# Patient Record
Sex: FEMALE | Race: WHITE | NOT HISPANIC OR LATINO | Employment: FULL TIME | URBAN - METROPOLITAN AREA
[De-identification: names, ages, dates, MRNs, and addresses within clinical notes are randomized per-mention and may not be internally consistent; named-entity substitution may affect disease eponyms.]

---

## 2023-09-26 PROBLEM — J44.9 COPD (CHRONIC OBSTRUCTIVE PULMONARY DISEASE) (MULTI): Status: ACTIVE | Noted: 2023-09-26

## 2023-09-26 PROBLEM — L90.0 LICHEN SCLEROSUS: Status: ACTIVE | Noted: 2023-09-26

## 2023-09-26 PROBLEM — M79.7 FIBROMYALGIA: Status: ACTIVE | Noted: 2023-09-26

## 2023-09-26 PROBLEM — R91.1 PULMONARY NODULE: Status: ACTIVE | Noted: 2023-09-26

## 2023-09-26 PROBLEM — M54.16 LUMBAR BACK PAIN WITH RADICULOPATHY AFFECTING LOWER EXTREMITY: Status: ACTIVE | Noted: 2023-09-26

## 2023-09-26 PROBLEM — E78.5 HYPERLIPIDEMIA: Status: ACTIVE | Noted: 2023-09-26

## 2023-09-26 PROBLEM — L29.9 ITCHY SCALP: Status: ACTIVE | Noted: 2023-09-26

## 2023-09-26 PROBLEM — M54.2 CERVICALGIA: Status: ACTIVE | Noted: 2023-09-26

## 2023-09-26 PROBLEM — R06.02 SOB (SHORTNESS OF BREATH) ON EXERTION: Status: ACTIVE | Noted: 2023-09-26

## 2023-09-27 ENCOUNTER — OFFICE VISIT (OUTPATIENT)
Dept: PRIMARY CARE | Facility: CLINIC | Age: 63
End: 2023-09-27
Payer: COMMERCIAL

## 2023-09-27 VITALS
TEMPERATURE: 97.5 F | RESPIRATION RATE: 18 BRPM | BODY MASS INDEX: 38.77 KG/M2 | DIASTOLIC BLOOD PRESSURE: 68 MMHG | WEIGHT: 215.4 LBS | HEART RATE: 83 BPM | SYSTOLIC BLOOD PRESSURE: 112 MMHG | OXYGEN SATURATION: 96 %

## 2023-09-27 DIAGNOSIS — E78.5 HYPERLIPIDEMIA, UNSPECIFIED HYPERLIPIDEMIA TYPE: ICD-10-CM

## 2023-09-27 DIAGNOSIS — R06.02 SOB (SHORTNESS OF BREATH): Primary | ICD-10-CM

## 2023-09-27 DIAGNOSIS — Z23 ENCOUNTER FOR IMMUNIZATION: ICD-10-CM

## 2023-09-27 DIAGNOSIS — Z12.11 SCREEN FOR COLON CANCER: ICD-10-CM

## 2023-09-27 PROCEDURE — 99213 OFFICE O/P EST LOW 20 MIN: CPT | Performed by: FAMILY MEDICINE

## 2023-09-27 PROCEDURE — 90471 IMMUNIZATION ADMIN: CPT | Performed by: FAMILY MEDICINE

## 2023-09-27 PROCEDURE — 90677 PCV20 VACCINE IM: CPT | Performed by: FAMILY MEDICINE

## 2023-09-27 RX ORDER — ROSUVASTATIN CALCIUM 20 MG/1
20 TABLET, COATED ORAL DAILY
Qty: 90 TABLET | Refills: 1 | Status: SHIPPED | OUTPATIENT
Start: 2023-09-27

## 2023-09-27 RX ORDER — CLOBETASOL PROPIONATE 0.5 MG/G
OINTMENT TOPICAL 2 TIMES DAILY
COMMUNITY
End: 2024-04-01 | Stop reason: SDUPTHER

## 2023-09-27 RX ORDER — ESTRADIOL 0.1 MG/G
0.1 CREAM VAGINAL 2 TIMES DAILY
COMMUNITY
Start: 2022-03-16 | End: 2023-09-27 | Stop reason: ALTCHOICE

## 2023-09-27 RX ORDER — ALBUTEROL SULFATE 90 UG/1
2 AEROSOL, METERED RESPIRATORY (INHALATION) EVERY 4 HOURS PRN
Qty: 8.5 G | Refills: 5 | Status: SHIPPED | OUTPATIENT
Start: 2023-09-27 | End: 2024-09-26

## 2023-09-27 RX ORDER — METHYLPREDNISOLONE 4 MG/1
TABLET ORAL
Qty: 21 TABLET | Refills: 2 | Status: SHIPPED | OUTPATIENT
Start: 2023-09-27 | End: 2024-04-01 | Stop reason: WASHOUT

## 2023-09-27 RX ORDER — ROSUVASTATIN CALCIUM 20 MG/1
20 TABLET, COATED ORAL DAILY
COMMUNITY
End: 2023-09-27 | Stop reason: SDUPTHER

## 2023-09-27 ASSESSMENT — PATIENT HEALTH QUESTIONNAIRE - PHQ9
2. FEELING DOWN, DEPRESSED OR HOPELESS: NOT AT ALL
1. LITTLE INTEREST OR PLEASURE IN DOING THINGS: NOT AT ALL
SUM OF ALL RESPONSES TO PHQ9 QUESTIONS 1 AND 2: 0

## 2023-09-27 NOTE — PROGRESS NOTES
"Subjective   Patient ID: Colt Brice is a 63 y.o. female who presents for breathing concern.    HPI  Pt presents with above concern  Ongoing x2 weeks ago pt was sick  Pt states it \"burns\" when breathing  States chest feels \"tight\". Like there is phlegm but unable to bring up  Denies chest pain  Pt has diagnosis of COPD  Pt not a smoker    Yearly BW ordered      Review of Systems  Constitutional:  no chills, no fever and no night sweats.  Eyes: no blurred vision and no eyesight problems.  ENT: no hearing loss, no nasal congestion, no hoarseness and no sore throat.  Neck: no mass (es) and no swelling.  Cardiovascular: no chest pain, no intermittent leg claudication, no lower extremity edema, no palpitation and no syncope.  Respiratory: no cough, no shortness of breath during exertion, no shortness of breath at rest and no wheezing.  Gastrointestinal: no abdominal pain, no blood in stools, no constipation, no diarrhea, no melena, no nausea, no rectal pain and no vomiting.  Genitourinary: no dysuria, no change in urinary frequency, no urinary hesitancy and no feelings of urinary urgency.  Musculoskeletal: no arthralgias, no back pain and no myalgias.  Integumentary: no new skin lesions and no rashes.  Neurological: no difficulty walking, no headache, no limb weakness, no numbness and no tingling.  Psychiatric/Behavioral: no anxiety, no depression, no anhedonia and no substance use disorders.  Endocrine: no recent weight gain and no recent weight loss.  Hematologic/Lymphatic: no tendency for easy bruising and no swollen glands    Objective   Physical Exam  Patient plaints of chest tightness occasional wheezing burning in the chest deep inspiration occasional fevers.  Cough but not bringing up much.  HEENT exam lungs show good air entry mild expiratory wheeze and some scattered rhonchi no crackles or retractions cardiac and abdominal exams are benign.  No sinus tenderness.  Neck is supple.  /68   Pulse 83   Temp " 36.4 °C (97.5 °F)   Resp 18   Wt 97.7 kg (215 lb 6.4 oz)   SpO2 96%   BMI 38.77 kg/m²     Lab Results   Component Value Date    WBC 9.6 02/01/2023    HGB 14.2 02/01/2023    HCT 43.7 02/01/2023    MCV 99 02/01/2023     02/01/2023       Assessment/Plan no obvious bacterial infection viral versus reactive plan is refill her albuterol rescue inhaler start her on a Medrol Dosepak if she has not improved or new symptoms develop she will let us know  Problem List Items Addressed This Visit       Hyperlipidemia     Other Visit Diagnoses       Screen for colon cancer

## 2023-10-13 ENCOUNTER — LAB (OUTPATIENT)
Dept: LAB | Facility: LAB | Age: 63
End: 2023-10-13
Payer: COMMERCIAL

## 2023-10-13 DIAGNOSIS — E78.5 HYPERLIPIDEMIA, UNSPECIFIED HYPERLIPIDEMIA TYPE: ICD-10-CM

## 2023-10-13 LAB
ALBUMIN SERPL BCP-MCNC: 4.1 G/DL (ref 3.4–5)
ALP SERPL-CCNC: 69 U/L (ref 33–136)
ALT SERPL W P-5'-P-CCNC: 25 U/L (ref 7–45)
ANION GAP SERPL CALC-SCNC: 13 MMOL/L (ref 10–20)
AST SERPL W P-5'-P-CCNC: 21 U/L (ref 9–39)
BASOPHILS # BLD AUTO: 0.04 X10*3/UL (ref 0–0.1)
BASOPHILS NFR BLD AUTO: 0.4 %
BILIRUB SERPL-MCNC: 0.5 MG/DL (ref 0–1.2)
BUN SERPL-MCNC: 10 MG/DL (ref 6–23)
CALCIUM SERPL-MCNC: 9.7 MG/DL (ref 8.6–10.3)
CHLORIDE SERPL-SCNC: 101 MMOL/L (ref 98–107)
CHOLEST SERPL-MCNC: 185 MG/DL (ref 0–199)
CHOLESTEROL/HDL RATIO: 3.5
CO2 SERPL-SCNC: 31 MMOL/L (ref 21–32)
CREAT SERPL-MCNC: 0.74 MG/DL (ref 0.5–1.05)
EOSINOPHIL # BLD AUTO: 0.14 X10*3/UL (ref 0–0.7)
EOSINOPHIL NFR BLD AUTO: 1.5 %
ERYTHROCYTE [DISTWIDTH] IN BLOOD BY AUTOMATED COUNT: 13.2 % (ref 11.5–14.5)
GFR SERPL CREATININE-BSD FRML MDRD: >90 ML/MIN/1.73M*2
GLUCOSE SERPL-MCNC: 151 MG/DL (ref 74–99)
HCT VFR BLD AUTO: 42.4 % (ref 36–46)
HDLC SERPL-MCNC: 52.8 MG/DL
HGB BLD-MCNC: 13.7 G/DL (ref 12–16)
IMM GRANULOCYTES # BLD AUTO: 0.03 X10*3/UL (ref 0–0.7)
IMM GRANULOCYTES NFR BLD AUTO: 0.3 % (ref 0–0.9)
LDLC SERPL CALC-MCNC: 84 MG/DL
LYMPHOCYTES # BLD AUTO: 3.39 X10*3/UL (ref 1.2–4.8)
LYMPHOCYTES NFR BLD AUTO: 35.3 %
MCH RBC QN AUTO: 31.9 PG (ref 26–34)
MCHC RBC AUTO-ENTMCNC: 32.3 G/DL (ref 32–36)
MCV RBC AUTO: 99 FL (ref 80–100)
MONOCYTES # BLD AUTO: 0.55 X10*3/UL (ref 0.1–1)
MONOCYTES NFR BLD AUTO: 5.7 %
NEUTROPHILS # BLD AUTO: 5.44 X10*3/UL (ref 1.2–7.7)
NEUTROPHILS NFR BLD AUTO: 56.8 %
NON HDL CHOLESTEROL: 132 MG/DL (ref 0–149)
NRBC BLD-RTO: 0 /100 WBCS (ref 0–0)
PLATELET # BLD AUTO: 245 X10*3/UL (ref 150–450)
PMV BLD AUTO: 11.1 FL (ref 7.5–11.5)
POTASSIUM SERPL-SCNC: 4.3 MMOL/L (ref 3.5–5.3)
PROT SERPL-MCNC: 7 G/DL (ref 6.4–8.2)
RBC # BLD AUTO: 4.3 X10*6/UL (ref 4–5.2)
SODIUM SERPL-SCNC: 141 MMOL/L (ref 136–145)
TRIGL SERPL-MCNC: 241 MG/DL (ref 0–149)
VLDL: 48 MG/DL (ref 0–40)
WBC # BLD AUTO: 9.6 X10*3/UL (ref 4.4–11.3)

## 2023-10-13 PROCEDURE — 80053 COMPREHEN METABOLIC PANEL: CPT

## 2023-10-13 PROCEDURE — 80061 LIPID PANEL: CPT

## 2023-10-13 PROCEDURE — 36415 COLL VENOUS BLD VENIPUNCTURE: CPT

## 2023-10-13 PROCEDURE — 85025 COMPLETE CBC W/AUTO DIFF WBC: CPT

## 2023-11-02 ENCOUNTER — LAB (OUTPATIENT)
Dept: LAB | Facility: LAB | Age: 63
End: 2023-11-02
Payer: COMMERCIAL

## 2023-11-02 DIAGNOSIS — R73.09 ELEVATED GLUCOSE: ICD-10-CM

## 2023-11-02 LAB
EST. AVERAGE GLUCOSE BLD GHB EST-MCNC: 148 MG/DL
HBA1C MFR BLD: 6.8 %

## 2023-11-02 PROCEDURE — 36415 COLL VENOUS BLD VENIPUNCTURE: CPT

## 2023-11-02 PROCEDURE — 83036 HEMOGLOBIN GLYCOSYLATED A1C: CPT

## 2023-11-14 ENCOUNTER — TELEPHONE (OUTPATIENT)
Dept: PRIMARY CARE | Facility: CLINIC | Age: 63
End: 2023-11-14
Payer: COMMERCIAL

## 2023-11-14 NOTE — TELEPHONE ENCOUNTER
Called patient and she is aware. Made appointment with Dr Newby for 1/5/24 because she won't have her new insurance until after the first of the year    Put a copy of her labs in the mail to her (verified address with patient)

## 2023-11-14 NOTE — TELEPHONE ENCOUNTER
----- Message from KT Mcgraw sent at 11/14/2023  3:29 PM EST -----  Called and left vm for patient regarding lab result  ----- Message -----  From: Baylee Foster MA  Sent: 11/9/2023   8:00 AM EST  To: KT Mcgraw    Please advise patient BW results   ----- Message -----  From: Lab, Background User  Sent: 11/2/2023   2:13 PM EST  To: Shaun Newby MD

## 2024-01-04 NOTE — PROGRESS NOTES
Subjective   Patient ID: Colt Brice is a 63 y.o. female who presents for Results and Knee Pain.    HPI  Presents for above reason  Pt would like to discuss recent lab work done on: 11/2 and 10/13  A1c was 6.8  Pt was concerned and made lifestyle changes  Is eating better and moving more  Has cut back on snacking as well    Pt states both of her knees are still bothering her.  States she has addressed this with PCP before  Denies swelling  Very painful as she has a job that requires standing a lot  Has not had x rays done on knees before  Rx'd prednisone and pain med w/ relief while taking but wore off after a while    No other concerns    Review of Systems  Constitutional:  no chills, no fever and no night sweats.  Eyes: no blurred vision and no eyesight problems.  ENT: no hearing loss, no nasal congestion, no hoarseness and no sore throat.  Neck: no mass (es) and no swelling.  Cardiovascular: no chest pain, no intermittent leg claudication, no lower extremity edema, no palpitation and no syncope.  Respiratory: no cough, no shortness of breath during exertion, no shortness of breath at rest and no wheezing.  Gastrointestinal: no abdominal pain, no blood in stools, no constipation, no diarrhea, no melena, no nausea, no rectal pain and no vomiting.  Genitourinary: no dysuria, no change in urinary frequency, no urinary hesitancy and no feelings of urinary urgency.  Musculoskeletal: no arthralgias, no back pain and no myalgias.  Integumentary: no new skin lesions and no rashes.  Neurological: no difficulty walking, no headache, no limb weakness, no numbness and no tingling.  Psychiatric/Behavioral: no anxiety, no depression, no anhedonia and no substance use disorders.  Endocrine: no recent weight gain and no recent weight loss.  Hematologic/Lymphatic: no tendency for easy bruising and no swollen glands    Objective   Physical Exam  Patient to discuss lab work A1c remains elevated she is started to take this more  seriously trying to lose weight down 5 pounds watching her diet more closely her  is diabetic and they are both trying to stick to the diet and.  She wants to hold off on medication to see if she can get some weight off and see if that makes a difference.  No physical complaints she says she feels great.  Physical exam today's office visit constitutional alert and oriented x3.    Head is atraumatic HEENT is within normal limits.    Neck supple no masses full range of motion.    Thyroid is normal in size no thyromegaly there is no carotid bruits.    Pulmonary exam shows clear to auscultation no respiratory distress.    Cardiovascular shows no murmur rub or gallop.  Regular rate and rhythm.    Abdominal exam soft nontender no hepatosplenomegaly or masses normal bowel sounds no rebound no guarding.    Musculoskeletal exam no joint pain no muscle pain full range of motion.    Psych exam normal mood and affect.    Dermatologic exam no skin lesions no rash no blemishes.    Neuro exam is no focal deficits.  Normal exam.    Extremities no edema normal pulses normal capillary refill.    /80   Pulse 83   Resp 18   Wt 95.4 kg (210 lb 6.4 oz)   SpO2 96%   BMI 37.87 kg/m²     Lab Results   Component Value Date    WBC 9.6 10/13/2023    HGB 13.7 10/13/2023    HCT 42.4 10/13/2023    MCV 99 10/13/2023     10/13/2023       Assessment/Plan plan have her continue the weight loss and dietary modification recheck a hemoglobin A1c at the end of April follow-up at that point  Problem List Items Addressed This Visit    None

## 2024-01-05 ENCOUNTER — OFFICE VISIT (OUTPATIENT)
Dept: PRIMARY CARE | Facility: CLINIC | Age: 64
End: 2024-01-05
Payer: COMMERCIAL

## 2024-01-05 VITALS
OXYGEN SATURATION: 96 % | BODY MASS INDEX: 37.87 KG/M2 | HEART RATE: 83 BPM | SYSTOLIC BLOOD PRESSURE: 130 MMHG | WEIGHT: 210.4 LBS | RESPIRATION RATE: 18 BRPM | DIASTOLIC BLOOD PRESSURE: 80 MMHG

## 2024-01-05 DIAGNOSIS — R73.9 HYPERGLYCEMIA: Primary | ICD-10-CM

## 2024-01-05 DIAGNOSIS — G89.29 CHRONIC PAIN OF BOTH KNEES: ICD-10-CM

## 2024-01-05 DIAGNOSIS — M25.562 CHRONIC PAIN OF BOTH KNEES: ICD-10-CM

## 2024-01-05 DIAGNOSIS — M25.561 CHRONIC PAIN OF BOTH KNEES: ICD-10-CM

## 2024-01-05 PROCEDURE — 99213 OFFICE O/P EST LOW 20 MIN: CPT | Performed by: FAMILY MEDICINE

## 2024-01-05 RX ORDER — OXYCODONE AND ACETAMINOPHEN 5; 325 MG/1; MG/1
1 TABLET ORAL EVERY 8 HOURS PRN
Qty: 21 TABLET | Refills: 0 | Status: SHIPPED | OUTPATIENT
Start: 2024-01-05 | End: 2024-01-12

## 2024-01-05 RX ORDER — PREDNISONE 10 MG/1
TABLET ORAL
Qty: 51 TABLET | Refills: 0 | Status: SHIPPED | OUTPATIENT
Start: 2024-01-05 | End: 2024-04-01 | Stop reason: WASHOUT

## 2024-02-26 ENCOUNTER — TELEPHONE (OUTPATIENT)
Dept: PRIMARY CARE | Facility: CLINIC | Age: 64
End: 2024-02-26
Payer: COMMERCIAL

## 2024-02-26 DIAGNOSIS — Z12.31 SCREENING MAMMOGRAM FOR BREAST CANCER: Primary | ICD-10-CM

## 2024-02-26 DIAGNOSIS — Z13.9 SCREENING DUE: ICD-10-CM

## 2024-02-26 NOTE — TELEPHONE ENCOUNTER
SPOKE WITH PATIENT - THIS WOULD BE HER YEARLY CT RECHECK ON SOME LUNG NODULES THAT THEY WERE KEEPING AN EYE ON - LOOKS LIKE IT IS LOW DOSE CHEST CT FOR SURVEILLANCE  PER LAST YEARS REPORT - PLEASE ADVISE.

## 2024-02-26 NOTE — TELEPHONE ENCOUNTER
PATIENT'S  WAS SEEN EARLIER TODAY. SHE WAS WANTING TO HAVE A REFERRAL PUT IN FOR A MAMMOGRAM AND A CT OF THE LUNGS.     I PUT AN ORDER FOR MAMMOGRAM, AND WILL SCHEDULE PATIENT. SHE  HAD ONE DONE LAST FEBRUARY.     IS THE PATIENT OKAY TO HAVE A CT OF THE LUNGS? IS THIS ORDER OKAY TO HAVE, AND IF SO WHAT WOULD BE THE DIAGNOSIS?   PLEASE ADVISE THANK YOU.

## 2024-02-26 NOTE — TELEPHONE ENCOUNTER
LEFT PATIENT A VOICEMAIL. MAMMOGRAM ORDER HAS BEEN PLACED. SHE NEEDS TO ANSWER QUESTIONS IN ORDER TO SCHEDULE. CT HAS NOT BEEN ORDERED YET, NEED OKAY FROM [facility]

## 2024-02-26 NOTE — TELEPHONE ENCOUNTER
Patient called back and is aware that we are waiting on the ok from the doctor for the CT scan. Once ordered she is aware we will call her back to schedule both the CT and mammogram

## 2024-02-27 NOTE — TELEPHONE ENCOUNTER
Patient aware orders were placed for the CT and mammogram. She wanted to set up her appointments. She has the scheduling #

## 2024-04-01 ENCOUNTER — HOSPITAL ENCOUNTER (OUTPATIENT)
Dept: RADIOLOGY | Facility: CLINIC | Age: 64
End: 2024-04-01
Payer: COMMERCIAL

## 2024-04-01 ENCOUNTER — OFFICE VISIT (OUTPATIENT)
Dept: OBSTETRICS AND GYNECOLOGY | Facility: CLINIC | Age: 64
End: 2024-04-01
Payer: COMMERCIAL

## 2024-04-01 ENCOUNTER — APPOINTMENT (OUTPATIENT)
Dept: RADIOLOGY | Facility: CLINIC | Age: 64
End: 2024-04-01
Payer: COMMERCIAL

## 2024-04-01 VITALS
DIASTOLIC BLOOD PRESSURE: 62 MMHG | WEIGHT: 211.6 LBS | SYSTOLIC BLOOD PRESSURE: 110 MMHG | HEIGHT: 63 IN | BODY MASS INDEX: 37.49 KG/M2

## 2024-04-01 DIAGNOSIS — L90.0 LICHEN SCLEROSUS: ICD-10-CM

## 2024-04-01 PROBLEM — Z01.419 NORMAL GYNECOLOGIC EXAMINATION: Status: ACTIVE | Noted: 2024-04-01

## 2024-04-01 PROBLEM — L29.9 ITCHY SCALP: Status: RESOLVED | Noted: 2023-09-26 | Resolved: 2024-04-01

## 2024-04-01 PROBLEM — Z90.710 H/O HYSTERECTOMY FOR BENIGN DISEASE: Status: ACTIVE | Noted: 2024-04-01

## 2024-04-01 PROCEDURE — 99396 PREV VISIT EST AGE 40-64: CPT | Performed by: OBSTETRICS & GYNECOLOGY

## 2024-04-01 PROCEDURE — 1036F TOBACCO NON-USER: CPT | Performed by: OBSTETRICS & GYNECOLOGY

## 2024-04-01 RX ORDER — CLOBETASOL PROPIONATE 0.5 MG/G
OINTMENT TOPICAL 2 TIMES DAILY
Qty: 45 G | Refills: 3 | Status: SHIPPED | OUTPATIENT
Start: 2024-04-01

## 2024-04-01 NOTE — PROGRESS NOTES
"GYNECOLOGY PROGRESS NOTE        CC:    Chief Complaint   Patient presents with    Annual Exam     Est patient - annual exam - no other issues  Mamm 2023 scat fibro  tissue - has order for this year  Dexa 2022 PCP manages  Colon 2020  Chaperone ruben rosas ma  Patient no longer taking the Medrol dosepak or the Prednisone        HPI:  Colt Brice is here for a routine GYN examination.  No GYN c/o, no AUB.      Lichen sclerosis symptoms doing well, using maintenance steroid and needs refill Rx, no recurrent itching complaints.          ROS:  GI - no blood in BMs  URO - no hematuria  GYN - no AUB or vaginal discharge  PSYCH - mood OK        PHYSICAL EXAM:  /62 (BP Location: Left arm, Patient Position: Sitting)   Ht 1.588 m (5' 2.5\")   Wt 96 kg (211 lb 9.6 oz)   BMI 38.09 kg/m²   GEN:  A&O, NAD  ABD:  NT/ND, soft, no palpable masses  URO:  atrophic urethral meatus, no bladder TTP  GYN:  minimal residual sclerosis with hypopigmentation changes of vulva and perineum vulva and perineum, atrophic vulva w/o lesions or ulcers, atrophic vaginal rugae without discharge or lesions, cervix/uterus surgically absent, adnexa w/o masses or TTP   BREAST:  no masses or TTP, no skin lesions or nipple discharge  DERM:  no hirsutism or acne   PSYCH:  normal affect, non-anxious      IMPRESSION/PLAN:  Problem List Items Addressed This Visit       Lichen sclerosus    Relevant Medications    clobetasol (Temovate) 0.05 % ointment        Pap and HPV n/a, prior hysterectomy for benign indications and no Hx of HGSIL.     Mammogram up to date and normal.  Density is scattered.  Doing yearly screening mammograms yearly, PCP orders.    Colon CA screenin with polyps, repeat due in 3-5 years (scheduled 10/2024).    Lichen sclerosus:  Symptoms have completely resolved and vulvar findings greatly improved.  Vulvar biopsy done by prior GYN in 2018.   Continue maintenance regimen of Clobetasol ointment 1-2 x a week--instructed patient " to use a thin film only to prevent vulvar thinning from steroid overuse.  Need for annual surveillance with lichen sclerosus due to potential for precancerous or cancerous changes in the setting of chronic inflammation discussed with the patient.          Wenceslao Faria, DO

## 2024-05-01 ENCOUNTER — HOSPITAL ENCOUNTER (OUTPATIENT)
Dept: RADIOLOGY | Facility: CLINIC | Age: 64
Discharge: HOME | End: 2024-05-01
Payer: COMMERCIAL

## 2024-05-01 VITALS — HEIGHT: 63 IN | WEIGHT: 210 LBS | BODY MASS INDEX: 37.21 KG/M2

## 2024-05-01 DIAGNOSIS — Z12.31 SCREENING MAMMOGRAM FOR BREAST CANCER: ICD-10-CM

## 2024-05-01 DIAGNOSIS — Z13.9 SCREENING DUE: ICD-10-CM

## 2024-05-01 PROCEDURE — 77063 BREAST TOMOSYNTHESIS BI: CPT | Performed by: STUDENT IN AN ORGANIZED HEALTH CARE EDUCATION/TRAINING PROGRAM

## 2024-05-01 PROCEDURE — 71271 CT THORAX LUNG CANCER SCR C-: CPT

## 2024-05-01 PROCEDURE — 77067 SCR MAMMO BI INCL CAD: CPT | Performed by: STUDENT IN AN ORGANIZED HEALTH CARE EDUCATION/TRAINING PROGRAM

## 2024-05-01 PROCEDURE — 77067 SCR MAMMO BI INCL CAD: CPT

## 2024-05-03 ENCOUNTER — TELEPHONE (OUTPATIENT)
Dept: PRIMARY CARE | Facility: CLINIC | Age: 64
End: 2024-05-03
Payer: COMMERCIAL

## 2024-05-03 NOTE — TELEPHONE ENCOUNTER
----- Message from Shaun Newby MD sent at 5/3/2024 12:24 PM EDT -----  Normal mammogram repeat in 1 year

## 2024-05-06 ENCOUNTER — TELEPHONE (OUTPATIENT)
Dept: PRIMARY CARE | Facility: CLINIC | Age: 64
End: 2024-05-06
Payer: COMMERCIAL

## 2024-05-06 DIAGNOSIS — R06.02 SOB (SHORTNESS OF BREATH): ICD-10-CM

## 2024-05-06 DIAGNOSIS — I70.90 CALCIFICATION OF ARTERY: ICD-10-CM

## 2024-05-06 NOTE — TELEPHONE ENCOUNTER
Patient aware.    Is agreeable to see Cardiology.   Referral placed     Patient has upcoming knee surgery. Will see Cardiology after that.

## 2024-05-06 NOTE — TELEPHONE ENCOUNTER
----- Message from Shaun Newby MD sent at 5/6/2024  8:00 AM EDT -----  CAT scan of the chest shows some small scattered nodules more than likely scar tissue repeat the scan in a year as a precaution.  Also calcified coronary arteries.  Recommend referral to cardiology for possible stress test versus heart cath as a precaution.

## 2024-05-22 ENCOUNTER — TELEPHONE (OUTPATIENT)
Dept: PRIMARY CARE | Facility: CLINIC | Age: 64
End: 2024-05-22
Payer: COMMERCIAL

## 2024-05-22 DIAGNOSIS — I25.84 CORONARY ARTERY CALCIFICATION: ICD-10-CM

## 2024-05-22 DIAGNOSIS — I25.10 CORONARY ARTERY CALCIFICATION: ICD-10-CM

## 2024-05-22 NOTE — TELEPHONE ENCOUNTER
----- Message from Shaun Newby MD sent at 5/22/2024  9:56 AM EDT -----  CAT scan shows some pulmonary nodules more than likely all benign radiology is recommending a repeat CAT scan in a year.  Also she has some calcification in the coronary arteries.  Puts her at slightly higher risk for problem I would recommend referral to cardiology to see if she needs a stress test

## 2024-05-22 NOTE — TELEPHONE ENCOUNTER
Patient called back and states she already received these messages. She already has an appointment with a cardiologist. She states she noticed that there is an order in for an A1C test but she had knee surgery last week and they did an A1C test and it was 6.4 from last time. She wanted to make you aware.   PETERSON

## 2024-07-16 ENCOUNTER — OFFICE VISIT (OUTPATIENT)
Dept: PRIMARY CARE | Facility: CLINIC | Age: 64
End: 2024-07-16
Payer: COMMERCIAL

## 2024-07-16 VITALS
OXYGEN SATURATION: 97 % | HEART RATE: 79 BPM | WEIGHT: 212.4 LBS | BODY MASS INDEX: 37.63 KG/M2 | SYSTOLIC BLOOD PRESSURE: 112 MMHG | HEIGHT: 63 IN | DIASTOLIC BLOOD PRESSURE: 66 MMHG | TEMPERATURE: 97.2 F

## 2024-07-16 DIAGNOSIS — E78.5 HYPERLIPIDEMIA, UNSPECIFIED HYPERLIPIDEMIA TYPE: ICD-10-CM

## 2024-07-16 DIAGNOSIS — R06.02 SOB (SHORTNESS OF BREATH): ICD-10-CM

## 2024-07-16 DIAGNOSIS — J41.0 SIMPLE CHRONIC BRONCHITIS (MULTI): ICD-10-CM

## 2024-07-16 DIAGNOSIS — E66.01 CLASS 2 SEVERE OBESITY DUE TO EXCESS CALORIES WITH SERIOUS COMORBIDITY AND BODY MASS INDEX (BMI) OF 38.0 TO 38.9 IN ADULT (MULTI): ICD-10-CM

## 2024-07-16 DIAGNOSIS — K04.7 DENTAL ABSCESS: Primary | ICD-10-CM

## 2024-07-16 PROCEDURE — 99213 OFFICE O/P EST LOW 20 MIN: CPT | Performed by: FAMILY MEDICINE

## 2024-07-16 PROCEDURE — 3008F BODY MASS INDEX DOCD: CPT | Performed by: FAMILY MEDICINE

## 2024-07-16 PROCEDURE — 1036F TOBACCO NON-USER: CPT | Performed by: FAMILY MEDICINE

## 2024-07-16 RX ORDER — AMOXICILLIN AND CLAVULANATE POTASSIUM 875; 125 MG/1; MG/1
875 TABLET, FILM COATED ORAL 2 TIMES DAILY
Qty: 20 TABLET | Refills: 0 | Status: SHIPPED | OUTPATIENT
Start: 2024-07-16 | End: 2024-07-26

## 2024-07-16 RX ORDER — ALBUTEROL SULFATE 90 UG/1
2 AEROSOL, METERED RESPIRATORY (INHALATION) EVERY 4 HOURS PRN
Qty: 8.5 G | Refills: 5 | Status: SHIPPED | OUTPATIENT
Start: 2024-07-16 | End: 2025-07-16

## 2024-07-16 RX ORDER — OXYCODONE AND ACETAMINOPHEN 5; 325 MG/1; MG/1
1 TABLET ORAL EVERY 8 HOURS PRN
Qty: 21 TABLET | Refills: 0 | Status: SHIPPED | OUTPATIENT
Start: 2024-07-16 | End: 2024-07-23

## 2024-07-16 RX ORDER — ROSUVASTATIN CALCIUM 20 MG/1
20 TABLET, COATED ORAL DAILY
Qty: 90 TABLET | Refills: 1 | Status: SHIPPED | OUTPATIENT
Start: 2024-07-16

## 2024-07-16 ASSESSMENT — PATIENT HEALTH QUESTIONNAIRE - PHQ9
SUM OF ALL RESPONSES TO PHQ9 QUESTIONS 1 AND 2: 0
1. LITTLE INTEREST OR PLEASURE IN DOING THINGS: NOT AT ALL
2. FEELING DOWN, DEPRESSED OR HOPELESS: NOT AT ALL

## 2024-07-16 NOTE — PROGRESS NOTES
Subjective   Patient ID: Colt Brice is a 64 y.o. female who presents for Dental Pain.  HPI    Patient presents in the office today with complaints of pain with her teeth. Patient states that she has broken her teeth and can not get into the dentist until a few days. Patient states that the dentist advised if to much pain to go to the emergency room and patient decided to come to see Dr. Newby. Patient states the pain is upper and lower. Patient states her jaw is swollen. Ongoing X 3 days. Has tried ice, Aleve, and Tylenol with no relief.     Review of Systems  Constitutional:  no chills, no fever and no night sweats.  Eyes: no blurred vision and no eyesight problems.  ENT: no hearing loss, no nasal congestion, no hoarseness and no sore throat.  Neck: no mass (es) and no swelling.  Cardiovascular: no chest pain, no intermittent leg claudication, no lower extremity edema, no palpitation and no syncope.  Respiratory: no cough, no shortness of breath during exertion, no shortness of breath at rest and no wheezing.  Gastrointestinal: no abdominal pain, no blood in stools, no constipation, no diarrhea, no melena, no nausea, no rectal pain and no vomiting.  Genitourinary: no dysuria, no change in urinary frequency, no urinary hesitancy and no feelings of urinary urgency.  Musculoskeletal: no arthralgias, no back pain and no myalgias.  Integumentary: no new skin lesions and no rashes.  Neurological: no difficulty walking, no headache, no limb weakness, no numbness and no tingling.  Psychiatric/Behavioral: no anxiety, no depression, no anhedonia and no substance use disorders.  Endocrine: no recent weight gain and no recent weight loss.  Hematologic/Lymphatic: no tendency for easy bruising and no swollen glands    Objective   Physical Exam  Patient with complaints of upper and lower jaw pain and swelling on the upper jaw pain was eating popcorn and accidentally bit on a kernel and broke 2 teeth 1 on the upper jaw and 1  "on the lower jaw gotten infected swollen painful.  She talked to her dentist who wants who is going to fix this on the 18th but recommended he call us to get antibiotics.  She also had questions about Wegovy or Zepp bound we discussed that she will check with insurance to see if that is covered.  Patient has tenderness and swelling in upper and lower jaw just lateral to the incisor on the upper jaw.  Teeth are broken she has some maxillary sinus and gumline pain soft tissue swelling.  /66 (BP Location: Left arm, Patient Position: Sitting)   Pulse 79   Temp 36.2 °C (97.2 °F) (Temporal)   Ht 1.588 m (5' 2.52\")   Wt 96.3 kg (212 lb 6.4 oz)   SpO2 97%   BMI 38.20 kg/m²     Lab Results   Component Value Date    WBC 9.6 10/13/2023    HGB 13.7 10/13/2023    HCT 42.4 10/13/2023    MCV 99 10/13/2023     10/13/2023       Assessment/Plan plan Augmentin 875 twice daily for 10 days she is given something for pain 5/3/2025 oxycodone take every 8 hours as needed and will follow-up with her if there is a problem after she has a dental procedure done  Problem List Items Addressed This Visit          Cardiac and Vasculature    Hyperlipidemia    Relevant Medications    rosuvastatin (Crestor) 20 mg tablet     Other Visit Diagnoses       Dental abscess    -  Primary    Relevant Medications    amoxicillin-pot clavulanate (Augmentin) 875-125 mg tablet    oxyCODONE-acetaminophen (Percocet) 5-325 mg tablet    BMI 38.0-38.9,adult        Class 2 severe obesity due to excess calories with serious comorbidity and body mass index (BMI) of 38.0 to 38.9 in adult (Multi)        SOB (shortness of breath)        Relevant Medications    albuterol (ProAir HFA) 90 mcg/actuation inhaler          "

## 2024-07-31 ENCOUNTER — APPOINTMENT (OUTPATIENT)
Dept: CARDIOLOGY | Facility: CLINIC | Age: 64
End: 2024-07-31
Payer: COMMERCIAL

## 2024-07-31 VITALS
HEART RATE: 81 BPM | HEIGHT: 62 IN | WEIGHT: 215 LBS | DIASTOLIC BLOOD PRESSURE: 73 MMHG | BODY MASS INDEX: 39.56 KG/M2 | SYSTOLIC BLOOD PRESSURE: 114 MMHG

## 2024-07-31 DIAGNOSIS — I70.90 CALCIFICATION OF ARTERY: ICD-10-CM

## 2024-07-31 DIAGNOSIS — Z87.891 FORMER TOBACCO USE: ICD-10-CM

## 2024-07-31 DIAGNOSIS — R06.02 SOB (SHORTNESS OF BREATH): ICD-10-CM

## 2024-07-31 DIAGNOSIS — R91.1 PULMONARY NODULE: ICD-10-CM

## 2024-07-31 DIAGNOSIS — J44.9 CHRONIC OBSTRUCTIVE PULMONARY DISEASE, UNSPECIFIED COPD TYPE (MULTI): ICD-10-CM

## 2024-07-31 DIAGNOSIS — E78.2 MIXED HYPERLIPIDEMIA: Primary | ICD-10-CM

## 2024-07-31 DIAGNOSIS — R93.1 ELEVATED CORONARY ARTERY CALCIUM SCORE: ICD-10-CM

## 2024-07-31 PROCEDURE — 3008F BODY MASS INDEX DOCD: CPT | Performed by: INTERNAL MEDICINE

## 2024-07-31 PROCEDURE — 1036F TOBACCO NON-USER: CPT | Performed by: INTERNAL MEDICINE

## 2024-07-31 PROCEDURE — 93000 ELECTROCARDIOGRAM COMPLETE: CPT | Performed by: INTERNAL MEDICINE

## 2024-07-31 PROCEDURE — 99204 OFFICE O/P NEW MOD 45 MIN: CPT | Performed by: INTERNAL MEDICINE

## 2024-07-31 RX ORDER — ASPIRIN 81 MG/1
81 TABLET ORAL DAILY
Qty: 90 TABLET | Refills: 3 | Status: SHIPPED | OUTPATIENT
Start: 2024-07-31 | End: 2025-07-31

## 2024-07-31 NOTE — PROGRESS NOTES
CARDIOLOGY NEW PATIENT OFFICE VISIT    Patient:  Colt Brice  YOB: 1960  MRN: [id number]       Chief Complaint:      Chief Complaint   Patient presents with    New Patient Visit       History of Present Illness:     Colt Brice is a 64 y.o. female who is being seen today at the request of Dr. Casey Newby for evaluation of cardiac status.  She did not have any prior history of atherosclerotic heart or valvular heart disease.  She has a history of hyperlipidemia for which she takes rosuvastatin.  She notes she was initially started on this in 2010.  She went off it for period time but restarted it 1 year ago.  She has a history of COPD.  She stopped smoking tobacco many years ago.  No history of hypertension or diabetes mellitus.  She notes that she underwent a stress test important organ back in 2019 and was told it was abnormal.  She underwent a cardiac catheterization and was told her arteries were normal.    She underwent CT lung screening on May 1, 2024.  She was noted to have stable lung nodules and moderate to severe emphysematous changes.  She was incidentally noted to have moderate coronary artery calcifications.  Colt reports that she currently is feeling well.  She had right knee surgery and is continuing to recover from this.  She works full-time in the cafeteria at a local school.  She typically is very active.  She denies any significant limitations.  She denies any recent chest pain or shortness breath.  She denies any orthopnea, PND, or increasing peripheral edema.  She denies any palpitations, lightheadedness, near-syncope, or syncope.  She denies any fever, chills, or cough.  She denies any nausea, vomiting, or diaphoresis.  She denies any hemoptysis, hematemesis, melena, or hematochezia.  EKG in the office today shows normal sinus rhythm and is a normal tracing.    Lab studies dated May 1, 2024 showed a normal CBC and BMP with exception of glucose 146.  Hemoglobin A1c was 6.4.   Lipid profile October 13, 2023 showed a cholesterol 185 with HDL 53, LDL 84, and triglycerides 241.  These were significantly improved from a lipid profile February 1, 2023 showing a total cholesterol of 310 and LDL of 204.    I spoke at length with Colt regarding evidence of coronary atherosclerotic plaque.  We discussed the need for optimal risk factor management.  She is not currently having any significant anginal or CHF symptoms.  Cardiac catheterization 5 years ago did not show any flow-limiting disease.  She will continue on rosuvastatin 20 mg daily with a target LDL of less than 70.  I advised her to restart on aspirin 81 mg daily.  No definite indication for additional cardiac testing at this time.  Other details as noted below.      Allergies:     Allergies   Allergen Reactions    Morphine Hives          Outpatient Medications:     Current Outpatient Medications   Medication Instructions    albuterol (ProAir HFA) 90 mcg/actuation inhaler 2 puffs, inhalation, Every 4 hours PRN    clobetasol (Temovate) 0.05 % ointment Topical, 2 times daily    rosuvastatin (CRESTOR) 20 mg, oral, Daily        Past Medical History:     Past Medical History:   Diagnosis Date    COPD (chronic obstructive pulmonary disease) (Multi)     Fibromyalgia     History of COVID-19     Lichen sclerosus        Social History:     Social History     Tobacco Use    Smoking status: Never    Smokeless tobacco: Never   Vaping Use    Vaping status: Never Used   Substance Use Topics    Alcohol use: Not Currently    Drug use: Never       Family History:   No family history on file.      Review of Systems:     12 point review of systems completed and is negative other than that detailed above.       Objective:     Vitals:    07/31/24 1310   BP: 114/73   Pulse: 81       Wt Readings from Last 4 Encounters:   07/31/24 97.5 kg (215 lb)   07/16/24 96.3 kg (212 lb 6.4 oz)   05/01/24 95.3 kg (210 lb)   04/01/24 96 kg (211 lb 9.6 oz)       Physical  Examination:   GENERAL:  Well developed, well nourished, in no acute distress.  CHEST:  Symmetric and nontender.  NEURO/PSYCH:  Alert and oriented times three with approppriate behavior and responses.  NECK:  Supple, no JVD, no bruit.  LUNGS:  Clear to auscultation bilaterally, normal respiratory effort.  HEART:  Rate and rhythm regular with no evident murmur, no gallop appreciated.        There are no rubs, clicks or heaves.  EXTREMITIES:  Warm with good color, no clubbing or cyanosis.  There is no edema noted.  PERIPHERAL VASCULAR:  Pulses present and equally palpable; 2+ throughout.      Lab:     CBC:   Lab Results   Component Value Date    WBC 9.6 10/13/2023    RBC 4.30 10/13/2023    HGB 13.7 10/13/2023    HCT 42.4 10/13/2023     10/13/2023        CMP:    Lab Results   Component Value Date     10/13/2023    K 4.3 10/13/2023     10/13/2023    CO2 31 10/13/2023    BUN 10 10/13/2023    CREATININE 0.74 10/13/2023    GLUCOSE 151 (H) 10/13/2023    CALCIUM 9.7 10/13/2023       Lipid Profile:    Lab Results   Component Value Date    TRIG 241 (H) 10/13/2023    HDL 52.8 10/13/2023    LDLCALC 84 10/13/2023       BMP:  Lab Results   Component Value Date     10/13/2023     02/01/2023     01/20/2022    K 4.3 10/13/2023    K 3.8 02/01/2023    K 4.1 01/20/2022     10/13/2023     02/01/2023     01/20/2022    CO2 31 10/13/2023    CO2 30 02/01/2023    CO2 31 01/20/2022    BUN 10 10/13/2023    BUN 12 02/01/2023    BUN 13 01/20/2022    CREATININE 0.74 10/13/2023    CREATININE 0.77 02/01/2023    CREATININE 0.79 01/20/2022       CBC:  Lab Results   Component Value Date    WBC 9.6 10/13/2023    WBC 9.6 02/01/2023    WBC 7.4 01/20/2022    RBC 4.30 10/13/2023    RBC 4.41 02/01/2023    RBC 4.44 01/20/2022    HGB 13.7 10/13/2023    HGB 14.2 02/01/2023    HGB 13.9 01/20/2022    HCT 42.4 10/13/2023    HCT 43.7 02/01/2023    HCT 44.1 01/20/2022    MCV 99 10/13/2023    MCV 99 02/01/2023     MCV 99 01/20/2022    MCH 31.9 10/13/2023    MCHC 32.3 10/13/2023    MCHC 32.5 02/01/2023    MCHC 31.5 (L) 01/20/2022    RDW 13.2 10/13/2023    RDW 12.9 02/01/2023    RDW 13.1 01/20/2022     10/13/2023     02/01/2023     01/20/2022    MPV 11.1 10/13/2023       Hepatic Function Panel:    Lab Results   Component Value Date    ALKPHOS 69 10/13/2023    ALT 25 10/13/2023    AST 21 10/13/2023    PROT 7.0 10/13/2023    BILITOT 0.5 10/13/2023       HgBA1c:    Lab Results   Component Value Date    HGBA1C 6.4 (H) 05/01/2024       Diagnostic Studies:     CT lung screening low dose  Result Date: 5/4/2024  Interpreted By:  Kyree Alvarez, STUDY: CT LUNG SCREENING LOW DOSE;  5/1/2024     1.  Few small bilateral noncalcified pulmonary nodules measuring up to 7 mm, likely benign. Continued screening with low-dose noncontrast chest CT in 12 months (from current date) is recommended. 2.  Moderate coronary artery calcifications and coronary artery disease risk factors.     LUNG RADS CATEGORY: Lung Rad: Lung-RADS 2 (Benign Appearance or Indolent Behavior)   Recommendation: Continue annual screening with Low Dose Chest CT in 12 months, recommended as per American College of Radiology Guidelines Lung-RADS Version 2022.     MACRO: None   Signed by: Kyree Alvarez 5/4/2024 6:41 PM to      Problem List:     Patient Active Problem List   Diagnosis    COPD (chronic obstructive pulmonary disease) (Multi)    Cervicalgia    Fibromyalgia    Hyperlipidemia    Lichen sclerosus    Lumbar back pain with radiculopathy affecting lower extremity    Pulmonary nodule    SOB (shortness of breath) on exertion    Normal gynecologic examination    H/O hysterectomy for benign disease       Assessment:     Problem List Items Addressed This Visit             ICD-10-CM    COPD (chronic obstructive pulmonary disease) (Multi) J44.9    Hyperlipidemia - Primary E78.5    Pulmonary nodule R91.1    BMI 39.0-39.9,adult Z68.39    Relevant Orders     Follow Up In Cardiology    Elevated coronary artery calcium score R93.1    Relevant Medications    aspirin 81 mg EC tablet    Other Relevant Orders    Follow Up In Cardiology    Former tobacco use Z87.891     Other Visit Diagnoses         Codes    SOB (shortness of breath)     R06.02    Calcification of artery     I70.90    Relevant Orders    ECG 12 Lead (Completed)            Plan:     -Restart aspirin 81 mg daily.  She will otherwise be continued on her same medications.     -She was advised to restrict sodium with an aim of no more than 2 grams per day.     -She was advised on the need for regular exercise with an aim to walk at least 20-30 minutes on at least 5 days per week.    -She was advised on the need to follow a Mediterranean style diet.    -Follow-up as scheduled.

## 2024-07-31 NOTE — PATIENT INSTRUCTIONS
PLEASE BRING ALL MEDICATION BOTTLES TO OFFICE VISITS.    START ASPIRIN 81 MG- TAKE 1 TABLET DAILY

## 2024-09-12 ENCOUNTER — OFFICE VISIT (OUTPATIENT)
Dept: PRIMARY CARE | Facility: CLINIC | Age: 64
End: 2024-09-12
Payer: COMMERCIAL

## 2024-09-12 DIAGNOSIS — E66.01 CLASS 2 SEVERE OBESITY WITH SERIOUS COMORBIDITY AND BODY MASS INDEX (BMI) OF 39.0 TO 39.9 IN ADULT, UNSPECIFIED OBESITY TYPE (MULTI): ICD-10-CM

## 2024-09-12 DIAGNOSIS — T14.8XXA MUSCLE STRAIN: ICD-10-CM

## 2024-09-12 DIAGNOSIS — M54.50 ACUTE RIGHT-SIDED LOW BACK PAIN WITHOUT SCIATICA: Primary | ICD-10-CM

## 2024-09-12 DIAGNOSIS — R31.9 HEMATURIA, UNSPECIFIED TYPE: ICD-10-CM

## 2024-09-12 DIAGNOSIS — R10.9 FLANK PAIN: ICD-10-CM

## 2024-09-12 LAB
POC APPEARANCE, URINE: CLEAR
POC BILIRUBIN, URINE: NEGATIVE
POC BLOOD, URINE: NEGATIVE
POC COLOR, URINE: YELLOW
POC GLUCOSE, URINE: NEGATIVE MG/DL
POC KETONES, URINE: NEGATIVE MG/DL
POC LEUKOCYTES, URINE: NEGATIVE
POC NITRITE,URINE: NEGATIVE
POC PH, URINE: 6 PH
POC PROTEIN, URINE: NEGATIVE MG/DL
POC SPECIFIC GRAVITY, URINE: 1.01
POC UROBILINOGEN, URINE: 0.2 EU/DL

## 2024-09-12 PROCEDURE — 87086 URINE CULTURE/COLONY COUNT: CPT

## 2024-09-12 PROCEDURE — 99213 OFFICE O/P EST LOW 20 MIN: CPT | Performed by: NURSE PRACTITIONER

## 2024-09-12 PROCEDURE — 81003 URINALYSIS AUTO W/O SCOPE: CPT | Performed by: NURSE PRACTITIONER

## 2024-09-12 RX ORDER — METHOCARBAMOL 500 MG/1
500 TABLET, FILM COATED ORAL 4 TIMES DAILY PRN
Qty: 40 TABLET | Refills: 0 | Status: SHIPPED | OUTPATIENT
Start: 2024-09-12 | End: 2024-11-11

## 2024-09-12 RX ORDER — DICLOFENAC SODIUM 75 MG/1
75 TABLET, DELAYED RELEASE ORAL 2 TIMES DAILY PRN
Qty: 60 TABLET | Refills: 0 | Status: SHIPPED | OUTPATIENT
Start: 2024-09-12 | End: 2024-11-11

## 2024-09-12 ASSESSMENT — ENCOUNTER SYMPTOMS
OCCASIONAL FEELINGS OF UNSTEADINESS: 0
DEPRESSION: 0
LOSS OF SENSATION IN FEET: 0

## 2024-09-12 ASSESSMENT — PATIENT HEALTH QUESTIONNAIRE - PHQ9
1. LITTLE INTEREST OR PLEASURE IN DOING THINGS: NOT AT ALL
SUM OF ALL RESPONSES TO PHQ9 QUESTIONS 1 AND 2: 0
2. FEELING DOWN, DEPRESSED OR HOPELESS: NOT AT ALL

## 2024-09-12 ASSESSMENT — PAIN SCALES - GENERAL: PAINLEVEL: 3

## 2024-09-12 NOTE — PROGRESS NOTES
"Subjective   Patient ID: Colt Brice is a 64 y.o. female who presents for Flank Pain.    HPI Pt states she is having pain on her right side and is coming around to the front side.Pt states onset almost 2 weeks but states it is increasing in pain.Pain scale is 5/10.Pt states she has tried one of her pain pills she had left  over from her knee surgery that did not help.  Pt states the pain is constant.    Review of Systems    Objective   /78 (BP Location: Left arm, Patient Position: Sitting, BP Cuff Size: Adult)   Pulse 77   Ht 1.575 m (5' 2\")   SpO2 98%   BMI 39.32 kg/m²     Physical Exam    Assessment/Plan          "

## 2024-09-12 NOTE — PROGRESS NOTES
Subjective   Patient ID: Colt Brice is a 64 y.o. female who is with chief complaint of pain on the lower back area on the right side.    HPI  Patient is a 64 y.o. female who CONSULTED AT [facility] today. Patient is with complaint of pain of lower back on the right side. Patient states condition started about 2 weeks ago. Patient states the pain is on the right side of her lower back, dull in character, 4-5/10, wax and wane but always there, and radiating to the lower end of the right shoulder blade and also to the right hip area. The pain does not radiate to the back of the thigh nor to the back of the leg of the involved side. she denies fever, chills, paresthesia, paralysis, nor change in the color of the skin or nails of involved extremity. she states she tried OTC medications which afforded only slight relief of symptoms. She states that earlier today she had a red tinged urine one time.     Review of Systems  General: no weight loss, generally healthy, no fatigue  Head:  no headaches / sinus pain, no vertigo, no injury  Eyes: no diplopia, no tearing, no pain,   Ears: no change in hearing, no tinnitus, no bleeding, no vertigo  Mouth:  no dental difficulties, no gingival bleeding, no sore throat, no loss of sense of taste  Nose: no congestion, no  discharge, no bleeding, no obstruction, no loss of sense of smell  Neck: no stiffness, no pain, no tenderness, no masses, no bruit  Pulmonary: no dyspnea, no wheezing, no hemoptysis, no cough  Cardiovascular: no chest pain, no palpitations, no syncope, no orthopnea  Gastrointestinal: no change in appetite, no dysphagia, no abdominal pains, no diarrhea, no emesis, no melena  Genito Urinary: (+) blood tinged urine, no dysuria, no urinary urgency, no nocturia, no incontinence,   Musculoskeletal: (+) right sided lower back pain, no limitation of range of motion, no paresthesia, no numbness  Constitutional: no fever, no chills, no night  sweats    Objective   Physical Exam  General: ambulatory, in no acute distress  Head: normocephalic, no lesions  Eyes: pink palpebral conjunctiva, anicteric sclerae, PERRLA, EOM's full  Abdomen: flat, NABS, soft, no direct tenderness, no rebound tenderness, no mass palpated, SIGNS: no [place], no Rovsings, no Psoas, no Obturator; No CVA tenderness,  Musculoskeletal: no limitation of range of motion, no paralysis, no deformity; BacK: (+) direct tenderness on the right paravertebral muscle area level of L3 to L5, negative straight  leg raise test on both right and left sides,   Extremities: full and equal peripheral pulses, no edema, < 2 seconds capillary refill on all toes    Assessment/Plan   Problem List Items Addressed This Visit             ICD-10-CM    BMI 39.0-39.9,adult Z68.39     Other Visit Diagnoses         Codes    Acute right-sided low back pain without sciatica    -  Primary M54.50    Relevant Medications    methocarbamol (Robaxin) 500 mg tablet    diclofenac (Voltaren) 75 mg EC tablet    Flank pain     R10.9    Relevant Orders    POCT UA Automated manually resulted (Completed)    Urine Culture    Hematuria, unspecified type     R31.9    Relevant Orders    POCT UA Automated manually resulted (Completed)    Urine Culture    Muscle strain     T14.8XXA    Relevant Medications    methocarbamol (Robaxin) 500 mg tablet    diclofenac (Voltaren) 75 mg EC tablet    Class 2 severe obesity with serious comorbidity and body mass index (BMI) of 39.0 to 39.9 in adult, unspecified obesity type (Multi)     E66.01, Z68.39        Urinalysis was done at the office today. Urinalysis result explained and discussed with patient. Urine sample submitted to laboratory for culture and sensitivity study. The laboratory examination requested were explained and discussed with patient.    DISCHARGE SUMMARY:   Patient seen and examined. Probable diagnosis, differential diagnosis, treatment, treatment options, and probable  complications were discussed and explained to patient. she was to take medication/s associated with this visit. she may take over-the-counter pain and/or fever medication if needed. Advised stretching and warm up exercises as tolerated prior to more intense physical exertion / exercise.  Advised to avoid heavy lifting, pulling, or pushing for at least a week. Reinforce stretching and exercises that strengthen core muscles. Advised increased oral fluid intake (2 liters of water or more per day). Reinforced to continue personal hygiene. Patient to return to clinic if there is worsening or persistence of symptoms. Patient verbalized understanding.    Patient to come back in 7 - 10 days if needed for worsening symptoms.         LIUDMILA Garcia-CNP 09/12/24 2:56 PM

## 2024-09-13 VITALS
BODY MASS INDEX: 39.32 KG/M2 | DIASTOLIC BLOOD PRESSURE: 78 MMHG | HEART RATE: 77 BPM | TEMPERATURE: 98.6 F | OXYGEN SATURATION: 98 % | HEIGHT: 62 IN | SYSTOLIC BLOOD PRESSURE: 126 MMHG

## 2024-09-13 LAB — BACTERIA UR CULT: NORMAL

## 2024-09-13 NOTE — PATIENT INSTRUCTIONS
DISCHARGE SUMMARY:   Patient seen and examined. Probable diagnosis, differential diagnosis, treatment, treatment options, and probable complications were discussed and explained to patient. she was to take medication/s associated with this visit. she may take over-the-counter pain and/or fever medication if needed. Advised stretching and warm up exercises as tolerated prior to more intense physical exertion / exercise.  Advised to avoid heavy lifting, pulling, or pushing for at least a week. Reinforce stretching and exercises that strengthen core muscles. Advised increased oral fluid intake (2 liters of water or more per day). Reinforced to continue personal hygiene. Patient to return to clinic if there is worsening or persistence of symptoms. Patient verbalized understanding.    Patient to come back in 7 - 10 days if needed for worsening symptoms.

## 2024-09-16 ENCOUNTER — DOCUMENTATION (OUTPATIENT)
Dept: PRIMARY CARE | Facility: CLINIC | Age: 64
End: 2024-09-16
Payer: COMMERCIAL

## 2024-09-27 ENCOUNTER — OFFICE VISIT (OUTPATIENT)
Dept: PRIMARY CARE | Facility: CLINIC | Age: 64
End: 2024-09-27
Payer: COMMERCIAL

## 2024-09-27 ENCOUNTER — TELEPHONE (OUTPATIENT)
Dept: PRIMARY CARE | Facility: CLINIC | Age: 64
End: 2024-09-27

## 2024-09-27 DIAGNOSIS — R73.9 HYPERGLYCEMIA: ICD-10-CM

## 2024-09-27 DIAGNOSIS — M25.511 RIGHT SHOULDER PAIN, UNSPECIFIED CHRONICITY: ICD-10-CM

## 2024-09-27 DIAGNOSIS — Z12.11 SCREEN FOR COLON CANCER: Primary | ICD-10-CM

## 2024-09-27 NOTE — PROGRESS NOTES
Subjective   Patient ID: Colt Brice is a 64 y.o. female who presents for No chief complaint on file..  HPI  Patient in office being seen for right shoulder pain x  since the onset  * injury  Pain is described as  Rates pain level today as   /10  Pain does/does not radiate  Is taking -minimal relief    pt      No other questions and or concerns for today's visit      Review of Systems  Constitutional:  no chills, no fever and no night sweats.  Eyes: no blurred vision and no eyesight problems.  ENT: no hearing loss, no nasal congestion, no hoarseness and no sore throat.  Neck: no mass (es) and no swelling.  Cardiovascular: no chest pain, no intermittent leg claudication, no lower extremity edema, no palpitation and no syncope.  Respiratory: no cough, no shortness of breath during exertion, no shortness of breath at rest and no wheezing.  Gastrointestinal: no abdominal pain, no blood in stools, no constipation, no diarrhea, no melena, no nausea, no rectal pain and no vomiting.  Genitourinary: no dysuria, no change in urinary frequency, no urinary hesitancy and no feelings of urinary urgency.  Musculoskeletal: no arthralgias, no back pain and no myalgias.  Integumentary: no new skin lesions and no rashes.  Neurological: no difficulty walking, no headache, no limb weakness, no numbness and no tingling.  Psychiatric/Behavioral: no anxiety, no depression, no anhedonia and no substance use disorders.  Endocrine: no recent weight gain and no recent weight loss.  Hematologic/Lymphatic: no tendency for easy bruising and no swollen glands    Objective   Physical Exam    There were no vitals taken for this visit.    Lab Results   Component Value Date    WBC 9.6 10/13/2023    HGB 13.7 10/13/2023    HCT 42.4 10/13/2023    MCV 99 10/13/2023     10/13/2023       Assessment/Plan   Problem List Items Addressed This Visit    None

## 2024-10-21 DIAGNOSIS — Z12.11 COLON CANCER SCREENING: ICD-10-CM

## 2024-10-21 PROBLEM — S83.241A ACUTE MEDIAL MENISCUS TEAR OF RIGHT KNEE: Status: ACTIVE | Noted: 2024-05-17

## 2024-10-21 RX ORDER — POLYETHYLENE GLYCOL 3350, SODIUM CHLORIDE, SODIUM BICARBONATE, POTASSIUM CHLORIDE 420; 11.2; 5.72; 1.48 G/4L; G/4L; G/4L; G/4L
4000 POWDER, FOR SOLUTION ORAL ONCE
Qty: 4000 ML | Refills: 0 | Status: SHIPPED | OUTPATIENT
Start: 2024-10-21 | End: 2024-10-21

## 2024-10-29 ENCOUNTER — ANESTHESIA EVENT (OUTPATIENT)
Dept: GASTROENTEROLOGY | Facility: EXTERNAL LOCATION | Age: 64
End: 2024-10-29

## 2024-11-06 DIAGNOSIS — Z12.11 COLON CANCER SCREENING: ICD-10-CM

## 2024-11-06 RX ORDER — SODIUM, POTASSIUM,MAG SULFATES 17.5-3.13G
SOLUTION, RECONSTITUTED, ORAL ORAL
Qty: 1 EACH | Refills: 0 | Status: SHIPPED | OUTPATIENT
Start: 2024-11-06

## 2024-11-08 ENCOUNTER — ANESTHESIA (OUTPATIENT)
Dept: GASTROENTEROLOGY | Facility: EXTERNAL LOCATION | Age: 64
End: 2024-11-08

## 2024-11-08 ENCOUNTER — APPOINTMENT (OUTPATIENT)
Dept: GASTROENTEROLOGY | Facility: EXTERNAL LOCATION | Age: 64
End: 2024-11-08
Payer: COMMERCIAL

## 2024-11-12 ENCOUNTER — APPOINTMENT (OUTPATIENT)
Dept: OBSTETRICS AND GYNECOLOGY | Facility: CLINIC | Age: 64
End: 2024-11-12
Payer: COMMERCIAL

## 2024-11-12 VITALS
WEIGHT: 213.4 LBS | SYSTOLIC BLOOD PRESSURE: 122 MMHG | DIASTOLIC BLOOD PRESSURE: 70 MMHG | HEIGHT: 62 IN | BODY MASS INDEX: 39.27 KG/M2

## 2024-11-12 DIAGNOSIS — L90.0 LICHEN SCLEROSUS: ICD-10-CM

## 2024-11-12 DIAGNOSIS — N39.3 STRESS INCONTINENCE, FEMALE: ICD-10-CM

## 2024-11-12 DIAGNOSIS — N95.2 ATROPHIC VULVOVAGINITIS: Primary | ICD-10-CM

## 2024-11-12 DIAGNOSIS — L29.2 VULVAR ITCHING: ICD-10-CM

## 2024-11-12 PROCEDURE — 99214 OFFICE O/P EST MOD 30 MIN: CPT | Performed by: OBSTETRICS & GYNECOLOGY

## 2024-11-12 PROCEDURE — 3008F BODY MASS INDEX DOCD: CPT | Performed by: OBSTETRICS & GYNECOLOGY

## 2024-11-12 RX ORDER — ESTRADIOL 0.1 MG/G
CREAM VAGINAL
Qty: 42.5 G | Refills: 3 | Status: SHIPPED | OUTPATIENT
Start: 2024-11-12

## 2024-11-12 RX ORDER — CLOBETASOL PROPIONATE 0.5 MG/G
OINTMENT TOPICAL
Qty: 30 G | Refills: 3 | Status: SHIPPED | OUTPATIENT
Start: 2024-11-12

## 2024-11-12 NOTE — PROGRESS NOTES
"GYNECOLOGY PROGRESS NOTE          CC:     Chief Complaint   Patient presents with    Follow-up     Est patient - patient states she is having flare up of her condition - Lichens sclerosis - a little more itching than normal.  Using the cream and its helping but noticed that she has to use it more often.  Chaperone Suzie Tierney MA          HPI:  Colt Brice is here for a follow up of her lichen sclerosus.    Having some recurrent itching symptoms.  Using steroid ointment now more frequently up to once a day and still having some itching symptoms but better.  When asked about incontinence pad use patient states that she has incontinence with sneeze/laugh/bend, no retention or urge IC symptoms.      ROS:  GYN - see HPI      PHYSICAL EXAM:  /70 (BP Location: Left arm, Patient Position: Sitting)   Ht 1.575 m (5' 2\")   Wt 96.8 kg (213 lb 6.4 oz)   BMI 39.03 kg/m²   GEN:  A&O, NAD  URO:  atrophic urethral meatus, no bladder TTP, + stress Valsava urine loss noted  GYN:  atrophic vulva with no residual hypopigmentation changes of lichen sclerosis, no FREEMAN lesions or HSV ulcers; atrophic distal vagina without discharge or lesions  PERINEUM:   no residual perineal and perianal hypopigmentation changes of lichen sclerosis   PSYCH:  normal affect, non-anxious        IMPRESSION/PLAN:    Problem List Items Addressed This Visit       Lichen sclerosus    Relevant Medications    clobetasol (Temovate) 0.05 % ointment     Other Visit Diagnoses       Atrophic vulvovaginitis    -  Primary    Relevant Medications    estradiol (Estrace) 0.01 % (0.1 mg/gram) vaginal cream    Vulvar itching        Relevant Medications    clobetasol (Temovate) 0.05 % ointment    Stress incontinence, female              Vulvar itching:   Known lichen sclerosis since 2018.  Suspect atrophic vulvitis as currently etiology for vulvar symptoms in the setting of steroid overuse.  May also have component of contact irritation from IC pad use for SUIC " symptoms.    Lichen sclerosus:  Chronic.  Flare in symptoms but vulvar findings resolved on examination.  Incomplete resolution of symptoms with daily use of topical steroid.  Suspect postmenopausal vulvar atrophy and/or contact irritation from use of IC pads are contributing to patient's persistent vulvar irritative symptoms given fairly benign appearing vulvar exam.  Vulvar biopsy showed lichen sclerosis in 2018 at prior GYN.   Recommend patient back down to recommended maintenance regimen of Clobetasol ointment 1-2 x a week--instructed patient to use a thin film only to prevent vulvar thinning from steroid overuse.      Atrophic Vulvitis:  Her symptoms of vulvar irritation are likely related to hypoestrogenic postmenopausal state and resulting atrophic changes.  Suspect steroid overuse for lichen sclerosis may be exacerbating atrophic symptoms.  Recommend trial of topical estrogen therapy.  Patient agreeable to trial of topical estrogen therapy, Rx for Estradiol cream provided for perineal use, use/AE reviewed.     Stress IC:  Chronic.  + Stress Valsava test on examination, no urge IC or urinary retention.  Treatment options reviewed--none, Bulkamid urethral bulking agents, or suburethral sling procedure.   Patient not interested in sling surgical management.  If elects for Bulkamid or sling procedure then will refer patient to may partner Dr. Hartley for further treatment.  H/O provided on Bulkamid.      Follow-up in 6 months for recheck of lichen sclerosis, sooner PRN if no improvement in symptoms with new Rx.      Wenceslao Faria,

## 2024-11-22 ENCOUNTER — APPOINTMENT (OUTPATIENT)
Dept: GASTROENTEROLOGY | Facility: EXTERNAL LOCATION | Age: 64
End: 2024-11-22
Payer: COMMERCIAL

## 2024-11-22 VITALS
RESPIRATION RATE: 12 BRPM | SYSTOLIC BLOOD PRESSURE: 140 MMHG | DIASTOLIC BLOOD PRESSURE: 79 MMHG | HEIGHT: 62 IN | WEIGHT: 213 LBS | OXYGEN SATURATION: 98 % | HEART RATE: 75 BPM | BODY MASS INDEX: 39.2 KG/M2 | TEMPERATURE: 97.9 F

## 2024-11-22 DIAGNOSIS — Z86.0100 HISTORY OF COLON POLYPS: ICD-10-CM

## 2024-11-22 DIAGNOSIS — Z12.11 SCREEN FOR COLON CANCER: Primary | ICD-10-CM

## 2024-11-22 PROCEDURE — 45390 COLONOSCOPY W/RESECTION: CPT | Performed by: INTERNAL MEDICINE

## 2024-11-22 PROCEDURE — 45380 COLONOSCOPY AND BIOPSY: CPT | Performed by: INTERNAL MEDICINE

## 2024-11-22 PROCEDURE — 45385 COLONOSCOPY W/LESION REMOVAL: CPT | Performed by: INTERNAL MEDICINE

## 2024-11-22 PROCEDURE — 88305 TISSUE EXAM BY PATHOLOGIST: CPT | Performed by: STUDENT IN AN ORGANIZED HEALTH CARE EDUCATION/TRAINING PROGRAM

## 2024-11-22 PROCEDURE — 88305 TISSUE EXAM BY PATHOLOGIST: CPT | Mod: TC,ELYLAB,WESLAB | Performed by: INTERNAL MEDICINE

## 2024-11-22 RX ORDER — PROPOFOL 10 MG/ML
INJECTION, EMULSION INTRAVENOUS AS NEEDED
Status: DISCONTINUED | OUTPATIENT
Start: 2024-11-22 | End: 2024-11-22

## 2024-11-22 RX ORDER — DICLOFENAC SODIUM 75 MG/1
75 TABLET, DELAYED RELEASE ORAL 2 TIMES DAILY PRN
COMMUNITY

## 2024-11-22 RX ORDER — SODIUM CHLORIDE 9 MG/ML
INJECTION, SOLUTION INTRAVENOUS CONTINUOUS PRN
Status: DISCONTINUED | OUTPATIENT
Start: 2024-11-22 | End: 2024-11-22

## 2024-11-22 RX ORDER — LIDOCAINE HYDROCHLORIDE 20 MG/ML
INJECTION, SOLUTION INFILTRATION; PERINEURAL AS NEEDED
Status: DISCONTINUED | OUTPATIENT
Start: 2024-11-22 | End: 2024-11-22

## 2024-11-22 SDOH — HEALTH STABILITY: MENTAL HEALTH: CURRENT SMOKER: 0

## 2024-11-22 ASSESSMENT — ENCOUNTER SYMPTOMS
VOMITING: 0
WHEEZING: 0
NAUSEA: 0
HEADACHES: 0
FEVER: 0
ABDOMINAL DISTENTION: 0
CONFUSION: 0
ARTHRALGIAS: 0
CONSTIPATION: 0
LIGHT-HEADEDNESS: 0
COLOR CHANGE: 0
ABDOMINAL PAIN: 0
SHORTNESS OF BREATH: 0
SPEECH DIFFICULTY: 0
JOINT SWELLING: 0
CHILLS: 0
UNEXPECTED WEIGHT CHANGE: 0
COUGH: 0
TROUBLE SWALLOWING: 0
DIZZINESS: 0
DIFFICULTY URINATING: 0
SLEEP DISTURBANCE: 0
DIARRHEA: 0

## 2024-11-22 ASSESSMENT — PAIN SCALES - GENERAL
PAINLEVEL_OUTOF10: 0 - NO PAIN
PAIN_LEVEL: 0
PAINLEVEL_OUTOF10: 0 - NO PAIN
PAINLEVEL_OUTOF10: 0 - NO PAIN

## 2024-11-22 ASSESSMENT — COLUMBIA-SUICIDE SEVERITY RATING SCALE - C-SSRS
6. HAVE YOU EVER DONE ANYTHING, STARTED TO DO ANYTHING, OR PREPARED TO DO ANYTHING TO END YOUR LIFE?: NO
1. IN THE PAST MONTH, HAVE YOU WISHED YOU WERE DEAD OR WISHED YOU COULD GO TO SLEEP AND NOT WAKE UP?: NO
2. HAVE YOU ACTUALLY HAD ANY THOUGHTS OF KILLING YOURSELF?: NO

## 2024-11-22 ASSESSMENT — PAIN - FUNCTIONAL ASSESSMENT
PAIN_FUNCTIONAL_ASSESSMENT: 0-10

## 2024-11-22 NOTE — DISCHARGE INSTRUCTIONS

## 2024-11-22 NOTE — ANESTHESIA PREPROCEDURE EVALUATION
Patient: Colt Brice    Procedure Information       Date/Time: 11/22/24 1020    Scheduled providers: Antoni Dial MD; LIUDMILA Elias-CRNA; Mckenna Bhatti RN    Procedure: COLONOSCOPY    Location: [facility]            Relevant Problems   Cardiac   (+) Hyperlipidemia      Pulmonary   (+) COPD (chronic obstructive pulmonary disease) (Multi)   (+) SOB (shortness of breath) on exertion      Endocrine   (+) BMI 39.0-39.9,adult      Musculoskeletal   (+) Fibromyalgia      Circulatory   (+) Elevated coronary artery calcium score       Clinical information reviewed:   Tobacco  Allergies  Meds   Med Hx  Surg Hx   Fam Hx  Soc Hx        NPO Detail:  NPO/Void Status  Carbohydrate Drink Given Prior to Surgery? : Y  Date of Last Liquid: 11/22/24  Time of Last Liquid: 0800  Date of Last Solid: 11/20/24  Time of Last Solid: 1800  Last Intake Type: Clear fluids; GI prep  Time of Last Void: 0953         Physical Exam    Airway  Mallampati: IV  TM distance: <3 FB  Neck ROM: full     Cardiovascular - normal exam     Dental - normal exam     Pulmonary - normal exam     Abdominal   (+) obese       Other findings: Denies  HAWA, snores      Anesthesia Plan    History of general anesthesia?: yes  History of complications of general anesthesia?: no    ASA 3     MAC   (Preoxygenated 2L prior to procedure.  Patient positioned self to comfort prior to sedation administered; eyes closed; continuous monitoring.)  The patient is not a current smoker.    intravenous induction   Anesthetic plan and risks discussed with patient.    Plan discussed with CRNA.

## 2024-11-22 NOTE — ANESTHESIA POSTPROCEDURE EVALUATION
Patient: Colt Brice    Procedure Summary       Date: 11/22/24 Room / Location: [facility]    Anesthesia Start: 1021 Anesthesia Stop:     Procedure: COLONOSCOPY Diagnosis:       Screen for colon cancer      Screen for colon cancer    Scheduled Providers: Antoni Dial MD; HEATHER Elias; Mckenna Bhatti RN Responsible Provider: HEATHER Elias    Anesthesia Type: MAC ASA Status: 3            Anesthesia Type: MAC    Vitals Value Taken Time   /88 11/22/24 1053   Temp 36 11/22/24 1053   Pulse 76 11/22/24 1053   Resp 20 11/22/24 1053   SpO2 98 11/22/24 1053       Anesthesia Post Evaluation    Patient location during evaluation: bedside  Patient participation: complete - patient participated  Level of consciousness: awake  Pain score: 0  Pain management: adequate  Airway patency: patent  Cardiovascular status: acceptable  Respiratory status: acceptable and room air  Hydration status: acceptable  Postoperative Nausea and Vomiting: none      No notable events documented.

## 2024-11-22 NOTE — H&P
History Of Present Illness  Colt Brice is a 64 y.o. female presenting with h/o polyps     Past Medical History  Past Medical History:   Diagnosis Date    COPD (chronic obstructive pulmonary disease) (Multi)     Fibromyalgia     History of COVID-19     Hyperlipidemia     Lichen sclerosus      Surgical History  Past Surgical History:   Procedure Laterality Date    CARPAL TUNNEL RELEASE       SECTION, LOW TRANSVERSE      x 3    CHOLECYSTECTOMY      HYSTERECTOMY  2006    GERMAN w/o BSO - fibroids    KNEE SURGERY       Social History  She reports that she has never smoked. She has never used smokeless tobacco. She reports that she does not currently use alcohol. She reports that she does not use drugs.    Family History  No family history on file.     Allergies  Allergies   Allergen Reactions    Morphine Hives     Review of Systems   Constitutional:  Negative for chills, fever and unexpected weight change.   HENT:  Negative for congestion and trouble swallowing.    Respiratory:  Negative for cough, shortness of breath and wheezing.    Cardiovascular:  Negative for chest pain.   Gastrointestinal:  Negative for abdominal distention, abdominal pain, constipation, diarrhea, nausea and vomiting.   Genitourinary:  Negative for difficulty urinating.   Musculoskeletal:  Negative for arthralgias and joint swelling.   Skin:  Negative for color change.   Neurological:  Negative for dizziness, speech difficulty, light-headedness and headaches.   Psychiatric/Behavioral:  Negative for confusion and sleep disturbance.         Physical Exam  Constitutional:       General: She is awake.      Appearance: Normal appearance.   HENT:      Head: Normocephalic and atraumatic.      Nose: Nose normal.      Mouth/Throat:      Mouth: Mucous membranes are moist.   Eyes:      Pupils: Pupils are equal, round, and reactive to light.   Neck:      Thyroid: No thyroid mass.      Trachea: Phonation normal.   Cardiovascular:      Rate and Rhythm:  "Normal rate and regular rhythm.      Heart sounds: Normal heart sounds. No murmur heard.     No gallop.   Pulmonary:      Effort: Pulmonary effort is normal. No respiratory distress.      Breath sounds: Normal air entry. No decreased breath sounds, wheezing, rhonchi or rales.   Abdominal:      General: Bowel sounds are normal. There is no distension.      Palpations: Abdomen is soft.      Tenderness: There is no abdominal tenderness.   Musculoskeletal:      Cervical back: Neck supple.      Right lower leg: No edema.      Left lower leg: No edema.   Skin:     General: Skin is warm.      Capillary Refill: Capillary refill takes less than 2 seconds.   Neurological:      General: No focal deficit present.      Mental Status: She is alert and oriented to person, place, and time. Mental status is at baseline.      Cranial Nerves: Cranial nerves 2-12 are intact.      Motor: Motor function is intact.   Psychiatric:         Attention and Perception: Attention and perception normal.         Mood and Affect: Mood normal.         Speech: Speech normal.         Behavior: Behavior normal.          Last Recorded Vitals  Blood pressure 130/64, pulse 71, temperature 36.1 °C (97 °F), temperature source Temporal, resp. rate 18, height 1.575 m (5' 2\"), weight 96.6 kg (213 lb), SpO2 98%.    Assessment/Plan   H/o polyps  Colonoscopy      Antoni Dial MD  "

## 2024-12-04 ENCOUNTER — APPOINTMENT (OUTPATIENT)
Dept: OBSTETRICS AND GYNECOLOGY | Facility: CLINIC | Age: 64
End: 2024-12-04
Payer: COMMERCIAL

## 2024-12-04 DIAGNOSIS — N39.3 STRESS INCONTINENCE, FEMALE: Primary | ICD-10-CM

## 2024-12-04 PROCEDURE — 99215 OFFICE O/P EST HI 40 MIN: CPT | Performed by: OBSTETRICS & GYNECOLOGY

## 2024-12-04 ASSESSMENT — PATIENT HEALTH QUESTIONNAIRE - PHQ9
2. FEELING DOWN, DEPRESSED OR HOPELESS: NOT AT ALL
1. LITTLE INTEREST OR PLEASURE IN DOING THINGS: NOT AT ALL
SUM OF ALL RESPONSES TO PHQ9 QUESTIONS 1 & 2: 0

## 2024-12-05 NOTE — PROGRESS NOTES
GYN PROGRESS NOTE          Chief complaint: follow up    HPI:  Patient answers are not available for this visit.  HPI       Urinary Incontinence     Additional comments: Est pt                Comments    Seen dr orozco pt has stress incontinence, has incontinence with laughing, coughing, sneezing          Last edited by Viv Leblanc MA on 2024  9:55 AM.      - She has urinary leakage with cough, laugh, sneeze and upon standing up from the toilet.  - Her episodes of nocturia are dependent on how much she drinks at night and she can have 3-4 episodes at times.      Reviewed case with patient, reviewed plans.    Discussed stress incontinence vs urge incontinence.    Reviewed treatments for stress incontinence such as Impressa tampons which can be worn for 8 hours at a time, urethral bulking which can be done in office or in the OR and has an 80% cure rate, or a urethral sling which has a 95% cure rate.  We reviewed the risk benefits of each treatment.    ROS:  GEN - no fevers or chills  RESP - no SOB or cough  GYN - see HPI      HISTORY:  Past Medical History:   Diagnosis Date    COPD (chronic obstructive pulmonary disease) (Multi)     Fibromyalgia     History of COVID-19     Hyperlipidemia     Lichen sclerosus      Past Surgical History:   Procedure Laterality Date    CARPAL TUNNEL RELEASE       SECTION, LOW TRANSVERSE      x 3    CHOLECYSTECTOMY      HYSTERECTOMY  2006    GERMAN w/o BSO - fibroids    KNEE SURGERY       Social History     Socioeconomic History    Marital status:      Spouse name: Not on file    Number of children: Not on file    Years of education: Not on file    Highest education level: Not on file   Occupational History    Not on file   Tobacco Use    Smoking status: Never    Smokeless tobacco: Never   Vaping Use    Vaping status: Never Used   Substance and Sexual Activity    Alcohol use: Not Currently    Drug use: Never    Sexual activity: Defer   Other Topics Concern    Not on file    Social History Narrative    Not on file     Social Drivers of Health     Financial Resource Strain: Not on file   Food Insecurity: Not on file   Transportation Needs: Not on file   Physical Activity: Not on file   Stress: Not on file   Social Connections: Not on file   Intimate Partner Violence: Not on file   Housing Stability: Not on file     Cancer-related family history is not on file.       PHYSICAL EXAM:  There were no vitals taken for this visit.  GEN:  A&O, NAD  HEENT:  head HC/AT, no visible goiter  PSYCH:  normal affect, non-anxious      IMPRESSION/PLAN:  64 y.o. stress incontinence.    - Consider urethral bulking or urethral sling    Follow up as scheduled     Paz HAMMER am scribing for virtually, and in the presence of Dr. Shaun Hartley on  12/04/24 at 7:17 PM     Shaun Hartley MD

## 2024-12-12 LAB
LABORATORY COMMENT REPORT: NORMAL
PATH REPORT.FINAL DX SPEC: NORMAL
PATH REPORT.GROSS SPEC: NORMAL
PATH REPORT.RELEVANT HX SPEC: NORMAL
PATH REPORT.TOTAL CANCER: NORMAL

## 2024-12-13 NOTE — RESULT ENCOUNTER NOTE
Colt Mcdonough    During recent colonoscopy multiple polyps were seen and completely removed.  Pathology results show these polyps as tubular adenoma.  This kind of polyp is benign but precancerous.  Based on pathology results I recommend repeating colonoscopy in 3 years.  Do not hesitate to contact me if you have any questions or concerns.    Sincerely,   Antoni Dial MD

## 2024-12-17 ENCOUNTER — OFFICE VISIT (OUTPATIENT)
Dept: PRIMARY CARE | Facility: CLINIC | Age: 64
End: 2024-12-17
Payer: COMMERCIAL

## 2024-12-17 VITALS
BODY MASS INDEX: 39.56 KG/M2 | HEART RATE: 76 BPM | TEMPERATURE: 97.2 F | SYSTOLIC BLOOD PRESSURE: 124 MMHG | WEIGHT: 215 LBS | HEIGHT: 62 IN | DIASTOLIC BLOOD PRESSURE: 80 MMHG | OXYGEN SATURATION: 97 % | RESPIRATION RATE: 16 BRPM

## 2024-12-17 DIAGNOSIS — R39.9 UTI SYMPTOMS: Primary | ICD-10-CM

## 2024-12-17 LAB
POC APPEARANCE, URINE: ABNORMAL
POC BILIRUBIN, URINE: ABNORMAL
POC BLOOD, URINE: ABNORMAL
POC COLOR, URINE: YELLOW
POC GLUCOSE, URINE: NEGATIVE MG/DL
POC KETONES, URINE: NEGATIVE MG/DL
POC LEUKOCYTES, URINE: ABNORMAL
POC NITRITE,URINE: NEGATIVE
POC PH, URINE: 6 PH
POC PROTEIN, URINE: ABNORMAL MG/DL
POC SPECIFIC GRAVITY, URINE: >=1.03
POC UROBILINOGEN, URINE: 0.2 EU/DL

## 2024-12-17 PROCEDURE — 87186 SC STD MICRODIL/AGAR DIL: CPT

## 2024-12-17 PROCEDURE — 81003 URINALYSIS AUTO W/O SCOPE: CPT | Performed by: NURSE PRACTITIONER

## 2024-12-17 PROCEDURE — 87086 URINE CULTURE/COLONY COUNT: CPT

## 2024-12-17 PROCEDURE — 99213 OFFICE O/P EST LOW 20 MIN: CPT | Performed by: NURSE PRACTITIONER

## 2024-12-17 RX ORDER — NITROFURANTOIN 25; 75 MG/1; MG/1
100 CAPSULE ORAL 2 TIMES DAILY
Qty: 14 CAPSULE | Refills: 0 | Status: SHIPPED | OUTPATIENT
Start: 2024-12-17 | End: 2024-12-19 | Stop reason: SINTOL

## 2024-12-17 ASSESSMENT — ENCOUNTER SYMPTOMS
FREQUENCY: 1
FEVER: 0
BACK PAIN: 0
VOMITING: 0
SHORTNESS OF BREATH: 0
DYSURIA: 1
PALPITATIONS: 0
COUGH: 0
CHILLS: 0
DIARRHEA: 0
CONSTIPATION: 0
ABDOMINAL PAIN: 0
FLANK PAIN: 0
NAUSEA: 0

## 2024-12-17 NOTE — PATIENT INSTRUCTIONS
Today you were seen in the [facility] for Urinary symptoms.   A urine culture will be sent and you will be notified of any changes to your current therapy.   A prescription for Macrobid was sent to your pharmacy. Please take this medication as prescribed and until completion.   Increase your daily consumption of water. Avoid caffeine, alcohol and citrus as they can be irritating to the urinary tract. May also add cranberry juice to daily regimen (sugar free)  Practice good hygiene (wiping front to back). Void more frequently throughout the day.   Follow up with your Primary Care Physician within 5 days or as needed  If any new or worsening symptoms, please proceed to emergency department for further evaluation.

## 2024-12-17 NOTE — PROGRESS NOTES
"Subjective   Patient ID: Colt Brice is a 64 y.o. female who presents for UTI    Symptoms: pressure, frequency  Length of symptoms: yesterday  OTC:  none      UTI   This is a new problem. The current episode started yesterday. The problem occurs every urination. The problem has been gradually worsening. The quality of the pain is described as burning. The patient is experiencing no pain. There has been no fever. There is A history of pyelonephritis. Associated symptoms include frequency and urgency. Pertinent negatives include no chills, flank pain, nausea or vomiting. Associated symptoms comments: urgency. She has tried nothing for the symptoms.        Review of Systems   Constitutional:  Negative for chills and fever.   Respiratory:  Negative for cough and shortness of breath.    Cardiovascular:  Negative for chest pain and palpitations.   Gastrointestinal:  Negative for abdominal pain, constipation, diarrhea, nausea and vomiting.   Genitourinary:  Positive for dysuria, frequency and urgency. Negative for flank pain.   Musculoskeletal:  Negative for back pain.       Objective   /80   Pulse 76   Temp 36.2 °C (97.2 °F)   Resp 16   Ht 1.575 m (5' 2\")   Wt 97.5 kg (215 lb)   SpO2 97%   BMI 39.32 kg/m²     Physical Exam  Vitals and nursing note reviewed.   Constitutional:       General: She is not in acute distress.     Appearance: Normal appearance.   Cardiovascular:      Rate and Rhythm: Normal rate and regular rhythm.      Heart sounds: Normal heart sounds.   Pulmonary:      Effort: Pulmonary effort is normal.      Breath sounds: Normal breath sounds. No wheezing, rhonchi or rales.   Abdominal:      General: Bowel sounds are normal.      Palpations: Abdomen is soft.      Tenderness: There is no abdominal tenderness. There is no right CVA tenderness or left CVA tenderness.   Musculoskeletal:      Cervical back: Neck supple.   Skin:     General: Skin is warm and dry.   Neurological:      Mental Status: " She is alert.   Psychiatric:         Mood and Affect: Mood normal.         Behavior: Behavior normal.         Assessment/Plan   Problem List Items Addressed This Visit    None  Visit Diagnoses         Codes    UTI symptoms    -  Primary R39.9    Relevant Medications    nitrofurantoin, macrocrystal-monohydrate, (Macrobid) 100 mg capsule    Other Relevant Orders    POCT UA Automated manually resulted    Urine Culture        UTI symptoms: UA positive for leuk, august, blood and protein. Start Macrobid as directed, urine culture pending. Increase rest and fluids. Follow up with PCP in 3-5 days for recheck, or sooner with any additional concerns. If developing any new/worsening symptoms, proceed to ER

## 2024-12-19 ENCOUNTER — TELEPHONE (OUTPATIENT)
Dept: PRIMARY CARE | Facility: CLINIC | Age: 64
End: 2024-12-19
Payer: COMMERCIAL

## 2024-12-19 DIAGNOSIS — R39.9 UTI SYMPTOMS: Primary | ICD-10-CM

## 2024-12-19 RX ORDER — SULFAMETHOXAZOLE AND TRIMETHOPRIM 800; 160 MG/1; MG/1
1 TABLET ORAL 2 TIMES DAILY
Qty: 14 TABLET | Refills: 0 | Status: SHIPPED | OUTPATIENT
Start: 2024-12-19 | End: 2024-12-26

## 2024-12-19 NOTE — TELEPHONE ENCOUNTER
Patient says that she was taking Macrobid, but it was causing her bad headaches so she stopped taking it. She is asking If there is another antibiotic she could be prescribed for the UTI.    Please advise, thank you.

## 2024-12-19 NOTE — TELEPHONE ENCOUNTER
Please let patient know I sent in Bactrim for her. She can stop the Macrobid and take Bactrim as directed.

## 2024-12-20 LAB — BACTERIA UR CULT: ABNORMAL

## 2025-02-12 PROBLEM — Z00.00 ROUTINE GENERAL MEDICAL EXAMINATION AT A HEALTH CARE FACILITY: Status: ACTIVE | Noted: 2025-02-12

## 2025-02-12 PROBLEM — Z13.220 LIPID SCREENING: Status: ACTIVE | Noted: 2025-02-12

## 2025-02-13 ENCOUNTER — OFFICE VISIT (OUTPATIENT)
Dept: PRIMARY CARE | Facility: CLINIC | Age: 65
End: 2025-02-13
Payer: COMMERCIAL

## 2025-02-13 VITALS
WEIGHT: 215.2 LBS | BODY MASS INDEX: 39.6 KG/M2 | HEIGHT: 62 IN | TEMPERATURE: 97.1 F | DIASTOLIC BLOOD PRESSURE: 76 MMHG | HEART RATE: 66 BPM | SYSTOLIC BLOOD PRESSURE: 138 MMHG | OXYGEN SATURATION: 97 %

## 2025-02-13 DIAGNOSIS — J44.9 CHRONIC OBSTRUCTIVE PULMONARY DISEASE, UNSPECIFIED COPD TYPE (MULTI): ICD-10-CM

## 2025-02-13 DIAGNOSIS — E66.812 CLASS 2 SEVERE OBESITY DUE TO EXCESS CALORIES WITH SERIOUS COMORBIDITY AND BODY MASS INDEX (BMI) OF 39.0 TO 39.9 IN ADULT: ICD-10-CM

## 2025-02-13 DIAGNOSIS — R09.89 CHEST CONGESTION: Primary | ICD-10-CM

## 2025-02-13 DIAGNOSIS — E78.5 HYPERLIPIDEMIA, UNSPECIFIED HYPERLIPIDEMIA TYPE: ICD-10-CM

## 2025-02-13 DIAGNOSIS — Z13.220 LIPID SCREENING: ICD-10-CM

## 2025-02-13 DIAGNOSIS — E66.01 CLASS 2 SEVERE OBESITY DUE TO EXCESS CALORIES WITH SERIOUS COMORBIDITY AND BODY MASS INDEX (BMI) OF 39.0 TO 39.9 IN ADULT: ICD-10-CM

## 2025-02-13 DIAGNOSIS — Z00.00 HEALTHCARE MAINTENANCE: ICD-10-CM

## 2025-02-13 DIAGNOSIS — J01.00 ACUTE MAXILLARY SINUSITIS, RECURRENCE NOT SPECIFIED: ICD-10-CM

## 2025-02-13 PROCEDURE — 99213 OFFICE O/P EST LOW 20 MIN: CPT | Performed by: FAMILY MEDICINE

## 2025-02-13 PROCEDURE — 3008F BODY MASS INDEX DOCD: CPT | Performed by: FAMILY MEDICINE

## 2025-02-13 PROCEDURE — 1036F TOBACCO NON-USER: CPT | Performed by: FAMILY MEDICINE

## 2025-02-13 RX ORDER — AZITHROMYCIN 250 MG/1
TABLET, FILM COATED ORAL
Qty: 6 TABLET | Refills: 0 | Status: SHIPPED | OUTPATIENT
Start: 2025-02-13 | End: 2025-02-18

## 2025-02-13 NOTE — PROGRESS NOTES
Subjective   Patient ID: Colt Brice is a 64 y.o. female who presents for chest congestion, Chills, and Earache.  HPI    Patient presents in the office today with complaints of chest congestion that feels tight, chills, bilateral earache. Denies coughing up any mucous. Patient states that she felt better and now she is getting worse. Ongoing X 1 week. Has tried OTC cold medication.    Review of Systems  Constitutional:  no chills, no fever and no night sweats.  Eyes: no blurred vision and no eyesight problems.  ENT: no hearing loss, no nasal congestion, no hoarseness and no sore throat.  Neck: no mass (es) and no swelling.  Cardiovascular: no chest pain, no intermittent leg claudication, no lower extremity edema, no palpitation and no syncope.  Respiratory: no cough, no shortness of breath during exertion, no shortness of breath at rest and no wheezing.  Gastrointestinal: no abdominal pain, no blood in stools, no constipation, no diarrhea, no melena, no nausea, no rectal pain and no vomiting.  Genitourinary: no dysuria, no change in urinary frequency, no urinary hesitancy and no feelings of urinary urgency.  Musculoskeletal: no arthralgias, no back pain and no myalgias.  Integumentary: no new skin lesions and no rashes.  Neurological: no difficulty walking, no headache, no limb weakness, no numbness and no tingling.  Psychiatric/Behavioral: no anxiety, no depression, no anhedonia and no substance use disorders.  Endocrine: no recent weight gain and no recent weight loss.  Hematologic/Lymphatic: no tendency for easy bruising and no swollen glands    Objective   Physical Exam  Patient in with 1 week history of slightly worsening sinus pressure congestion occasionally productive cough some rumbling in the upper chest denies chest pain or shortness of breath with exertion no nausea vomiting or diarrhea.  Intermittent chills physical exam maxillary sinus discomfort pharynx inflamed adenopathy without exudate neck is  "supple lungs are clear.  /76 (BP Location: Left arm, Patient Position: Sitting)   Pulse 66   Temp 36.2 °C (97.1 °F) (Temporal)   Ht 1.575 m (5' 2\")   Wt 97.6 kg (215 lb 3.2 oz)   SpO2 97%   BMI 39.36 kg/m²     Lab Results   Component Value Date    WBC 9.6 10/13/2023    HGB 13.7 10/13/2023    HCT 42.4 10/13/2023    MCV 99 10/13/2023     10/13/2023       Assessment/Plan assessment sinusitis pharyngitis plan Z-Mauri and follow-up if not improving  Problem List Items Addressed This Visit          Cardiac and Vasculature    Hyperlipidemia    Relevant Orders    Lipid Panel    Lipid screening       Endocrine/Metabolic    BMI 39.0-39.9,adult       Pulmonary and Pneumonias    COPD (chronic obstructive pulmonary disease) (Multi)     Other Visit Diagnoses       Chest congestion    -  Primary    Class 2 severe obesity due to excess calories with serious comorbidity and body mass index (BMI) of 39.0 to 39.9 in adult        Healthcare maintenance        Relevant Orders    Comprehensive Metabolic Panel    CBC and Auto Differential    Acute maxillary sinusitis, recurrence not specified        Relevant Medications    azithromycin (Zithromax) 250 mg tablet            "

## 2025-03-04 ENCOUNTER — TELEPHONE (OUTPATIENT)
Dept: PRIMARY CARE | Facility: CLINIC | Age: 65
End: 2025-03-04
Payer: COMMERCIAL

## 2025-03-04 DIAGNOSIS — R73.9 HYPERGLYCEMIA: ICD-10-CM

## 2025-03-12 LAB
ALBUMIN SERPL-MCNC: 4.4 G/DL (ref 3.6–5.1)
ALP SERPL-CCNC: 72 U/L (ref 37–153)
ALT SERPL-CCNC: 20 U/L (ref 6–29)
ANION GAP SERPL CALCULATED.4IONS-SCNC: 8 MMOL/L (CALC) (ref 7–17)
AST SERPL-CCNC: 18 U/L (ref 10–35)
BASOPHILS # BLD AUTO: 44 CELLS/UL (ref 0–200)
BASOPHILS NFR BLD AUTO: 0.5 %
BILIRUB SERPL-MCNC: 0.5 MG/DL (ref 0.2–1.2)
BUN SERPL-MCNC: 11 MG/DL (ref 7–25)
CALCIUM SERPL-MCNC: 9.3 MG/DL (ref 8.6–10.4)
CHLORIDE SERPL-SCNC: 102 MMOL/L (ref 98–110)
CHOLEST SERPL-MCNC: 168 MG/DL
CHOLEST/HDLC SERPL: 3.2 (CALC)
CO2 SERPL-SCNC: 31 MMOL/L (ref 20–32)
CREAT SERPL-MCNC: 0.7 MG/DL (ref 0.5–1.05)
EGFRCR SERPLBLD CKD-EPI 2021: 97 ML/MIN/1.73M2
EOSINOPHIL # BLD AUTO: 141 CELLS/UL (ref 15–500)
EOSINOPHIL NFR BLD AUTO: 1.6 %
ERYTHROCYTE [DISTWIDTH] IN BLOOD BY AUTOMATED COUNT: 13 % (ref 11–15)
GLUCOSE SERPL-MCNC: 164 MG/DL (ref 65–139)
HCT VFR BLD AUTO: 42.9 % (ref 35–45)
HDLC SERPL-MCNC: 53 MG/DL
HGB BLD-MCNC: 14.1 G/DL (ref 11.7–15.5)
LDLC SERPL CALC-MCNC: 90 MG/DL (CALC)
LYMPHOCYTES # BLD AUTO: 3300 CELLS/UL (ref 850–3900)
LYMPHOCYTES NFR BLD AUTO: 37.5 %
MCH RBC QN AUTO: 31 PG (ref 27–33)
MCHC RBC AUTO-ENTMCNC: 32.9 G/DL (ref 32–36)
MCV RBC AUTO: 94.3 FL (ref 80–100)
MONOCYTES # BLD AUTO: 414 CELLS/UL (ref 200–950)
MONOCYTES NFR BLD AUTO: 4.7 %
NEUTROPHILS # BLD AUTO: 4902 CELLS/UL (ref 1500–7800)
NEUTROPHILS NFR BLD AUTO: 55.7 %
NONHDLC SERPL-MCNC: 115 MG/DL (CALC)
PLATELET # BLD AUTO: 253 THOUSAND/UL (ref 140–400)
PMV BLD REES-ECKER: 11.2 FL (ref 7.5–12.5)
POTASSIUM SERPL-SCNC: 4.3 MMOL/L (ref 3.5–5.3)
PROT SERPL-MCNC: 6.5 G/DL (ref 6.1–8.1)
RBC # BLD AUTO: 4.55 MILLION/UL (ref 3.8–5.1)
SODIUM SERPL-SCNC: 141 MMOL/L (ref 135–146)
TRIGL SERPL-MCNC: 146 MG/DL
WBC # BLD AUTO: 8.8 THOUSAND/UL (ref 3.8–10.8)

## 2025-03-13 ENCOUNTER — OFFICE VISIT (OUTPATIENT)
Dept: PRIMARY CARE | Facility: CLINIC | Age: 65
End: 2025-03-13
Payer: COMMERCIAL

## 2025-03-13 VITALS
HEIGHT: 62 IN | SYSTOLIC BLOOD PRESSURE: 116 MMHG | OXYGEN SATURATION: 95 % | HEART RATE: 80 BPM | TEMPERATURE: 96.8 F | BODY MASS INDEX: 39.75 KG/M2 | WEIGHT: 216 LBS | DIASTOLIC BLOOD PRESSURE: 72 MMHG

## 2025-03-13 DIAGNOSIS — E11.9 TYPE 2 DIABETES MELLITUS WITHOUT COMPLICATION, WITHOUT LONG-TERM CURRENT USE OF INSULIN (MULTI): Primary | ICD-10-CM

## 2025-03-13 DIAGNOSIS — E78.5 HYPERLIPIDEMIA, UNSPECIFIED HYPERLIPIDEMIA TYPE: ICD-10-CM

## 2025-03-13 DIAGNOSIS — R73.9 HYPERGLYCEMIA: ICD-10-CM

## 2025-03-13 LAB
EST. AVERAGE GLUCOSE BLD GHB EST-MCNC: 171 MG/DL
EST. AVERAGE GLUCOSE BLD GHB EST-SCNC: 9.5 MMOL/L
HBA1C MFR BLD: 7.6 % OF TOTAL HGB

## 2025-03-13 PROCEDURE — 99213 OFFICE O/P EST LOW 20 MIN: CPT | Performed by: FAMILY MEDICINE

## 2025-03-13 PROCEDURE — 3074F SYST BP LT 130 MM HG: CPT | Performed by: FAMILY MEDICINE

## 2025-03-13 PROCEDURE — 3078F DIAST BP <80 MM HG: CPT | Performed by: FAMILY MEDICINE

## 2025-03-13 PROCEDURE — 3008F BODY MASS INDEX DOCD: CPT | Performed by: FAMILY MEDICINE

## 2025-03-13 PROCEDURE — 1036F TOBACCO NON-USER: CPT | Performed by: FAMILY MEDICINE

## 2025-03-13 RX ORDER — ROSUVASTATIN CALCIUM 20 MG/1
20 TABLET, COATED ORAL DAILY
Qty: 90 TABLET | Refills: 1 | Status: SHIPPED | OUTPATIENT
Start: 2025-03-13

## 2025-03-13 NOTE — PROGRESS NOTES
Subjective   Patient ID: Colt Brice is a 64 y.o. female who presents for No chief complaint on file..  HPI  Patient presents in the office today with concerns of last A1C yesterday at 7.6% and glucose at 164.  Patient states if her A1c came back higher than last time then she would have to discuss mounjaro.   Review of Systems  Constitutional:  no chills, no fever and no night sweats.  Eyes: no blurred vision and no eyesight problems.  ENT: no hearing loss, no nasal congestion, no hoarseness and no sore throat.  Neck: no mass (es) and no swelling.  Cardiovascular: no chest pain, no intermittent leg claudication, no lower extremity edema, no palpitation and no syncope.  Respiratory: no cough, no shortness of breath during exertion, no shortness of breath at rest and no wheezing.  Gastrointestinal: no abdominal pain, no blood in stools, no constipation, no diarrhea, no melena, no nausea, no rectal pain and no vomiting.  Genitourinary: no dysuria, no change in urinary frequency, no urinary hesitancy and no feelings of urinary urgency.  Musculoskeletal: no arthralgias, no back pain and no myalgias.  Integumentary: no new skin lesions and no rashes.  Neurological: no difficulty walking, no headache, no limb weakness, no numbness and no tingling.  Psychiatric/Behavioral: no anxiety, no depression, no anhedonia and no substance use disorders.  Endocrine: no recent weight gain and no recent weight loss.  Hematologic/Lymphatic: no tendency for easy bruising and no swollen glands    Objective   Physical Exam  Patient in the office follow-up diabetes worsening A1c obese female in no acute distress again talked about dietary modification and weight loss struggling to control her blood sugars A1c is going up.  Lungs clear cardiac and abdominal exams are benign.  There were no vitals taken for this visit.    Lab Results   Component Value Date    WBC 8.8 03/12/2025    HGB 14.1 03/12/2025    HCT 42.9 03/12/2025    MCV 94.3  03/12/2025     03/12/2025       Assessment/Plan poor glucose control plan Ozempic 0.5 mg once a week and follow-up in a month  Problem List Items Addressed This Visit    None

## 2025-03-31 ENCOUNTER — TELEPHONE (OUTPATIENT)
Dept: PRIMARY CARE | Facility: CLINIC | Age: 65
End: 2025-03-31
Payer: COMMERCIAL

## 2025-03-31 DIAGNOSIS — E11.9 TYPE 2 DIABETES MELLITUS WITHOUT COMPLICATION, WITHOUT LONG-TERM CURRENT USE OF INSULIN: ICD-10-CM

## 2025-03-31 RX ORDER — BLOOD SUGAR DIAGNOSTIC
STRIP MISCELLANEOUS
Qty: 100 EACH | Refills: 1 | Status: SHIPPED | OUTPATIENT
Start: 2025-03-31

## 2025-03-31 RX ORDER — LANCETS 26 GAUGE
EACH MISCELLANEOUS
Qty: 1 EACH | Refills: 0 | Status: SHIPPED | OUTPATIENT
Start: 2025-03-31 | End: 2026-03-31

## 2025-03-31 RX ORDER — DEXTROSE 4 G
TABLET,CHEWABLE ORAL
Qty: 1 EACH | Refills: 0 | Status: CANCELLED | OUTPATIENT
Start: 2025-03-31

## 2025-03-31 RX ORDER — DEXTROSE 4 G
TABLET,CHEWABLE ORAL
Qty: 1 EACH | Refills: 0 | Status: SHIPPED | OUTPATIENT
Start: 2025-03-31

## 2025-03-31 NOTE — TELEPHONE ENCOUNTER
PATIENT RECENTLY DIAGNOSIS WITH DM II AND WOULD LIKE TO GET A SCRIPT FOR A GLUCOSE MACHINE, LANCETS AND TEST STRIPS.    CAN YOU PLEASE PEND SCRIPT FOR PROVIDER.    THANK YOU.    [facility].

## 2025-04-02 ENCOUNTER — TELEPHONE (OUTPATIENT)
Dept: PRIMARY CARE | Facility: CLINIC | Age: 65
End: 2025-04-02
Payer: COMMERCIAL

## 2025-04-02 DIAGNOSIS — E11.9 TYPE 2 DIABETES MELLITUS WITHOUT COMPLICATION, WITHOUT LONG-TERM CURRENT USE OF INSULIN: ICD-10-CM

## 2025-04-02 NOTE — TELEPHONE ENCOUNTER
PATIENT IS CALLING - IS CURRENTLY ON OZEMPIC 0.5 MG - SHE HAS DONE WELL ON THIS DOSE - WONDERING IF WE CAN GO AHEAD AND INCREASE TO NEXT DOSE?  IF OK, NEEDS TO BE SENT TO [facility] - PLEASE ADVISE.

## 2025-04-02 NOTE — TELEPHONE ENCOUNTER
Naeem Clarke MD  Do [id number] [id number] minutes ago (12:29 PM)       Okay to refill Ozempic to the next dose of 1 mg weekly.  4 pens with 1 refill.  Please let her know and arrange

## 2025-04-03 ENCOUNTER — APPOINTMENT (OUTPATIENT)
Dept: OBSTETRICS AND GYNECOLOGY | Facility: CLINIC | Age: 65
End: 2025-04-03
Payer: COMMERCIAL

## 2025-04-07 ENCOUNTER — TELEPHONE (OUTPATIENT)
Dept: PRIMARY CARE | Facility: CLINIC | Age: 65
End: 2025-04-07
Payer: COMMERCIAL

## 2025-04-07 DIAGNOSIS — E11.9 TYPE 2 DIABETES MELLITUS WITHOUT COMPLICATION, WITHOUT LONG-TERM CURRENT USE OF INSULIN: ICD-10-CM

## 2025-04-07 NOTE — TELEPHONE ENCOUNTER
Received 2 RX refill requests from [facility] for clarification requests:    1) For Autolet lancing device kit: Pharmacy asks can we change RX to lancets instead of kit?    2) For OneTouch Ultra Test strips: Pharmacy asks can we remove [place]? Insurance covers [place]    PLEASE ADDRESS BOTH MESSAGES    Thanks    Preferred pharmacy [facility]

## 2025-04-08 ENCOUNTER — APPOINTMENT (OUTPATIENT)
Dept: OBSTETRICS AND GYNECOLOGY | Facility: CLINIC | Age: 65
End: 2025-04-08
Payer: COMMERCIAL

## 2025-04-08 VITALS
DIASTOLIC BLOOD PRESSURE: 68 MMHG | SYSTOLIC BLOOD PRESSURE: 104 MMHG | BODY MASS INDEX: 39.03 KG/M2 | WEIGHT: 212.1 LBS | HEIGHT: 62 IN

## 2025-04-08 DIAGNOSIS — L29.2 VULVAR ITCHING: ICD-10-CM

## 2025-04-08 DIAGNOSIS — Z01.419 NORMAL GYNECOLOGIC EXAMINATION: Primary | ICD-10-CM

## 2025-04-08 DIAGNOSIS — Z86.0100 HISTORY OF COLON POLYPS: ICD-10-CM

## 2025-04-08 DIAGNOSIS — Z12.31 BREAST CANCER SCREENING BY MAMMOGRAM: ICD-10-CM

## 2025-04-08 DIAGNOSIS — L90.0 LICHEN SCLEROSUS: ICD-10-CM

## 2025-04-08 PROBLEM — R06.02 SOB (SHORTNESS OF BREATH) ON EXERTION: Status: RESOLVED | Noted: 2023-09-26 | Resolved: 2025-04-08

## 2025-04-08 PROCEDURE — 1036F TOBACCO NON-USER: CPT | Performed by: OBSTETRICS & GYNECOLOGY

## 2025-04-08 PROCEDURE — 99396 PREV VISIT EST AGE 40-64: CPT | Performed by: OBSTETRICS & GYNECOLOGY

## 2025-04-08 PROCEDURE — 3008F BODY MASS INDEX DOCD: CPT | Performed by: OBSTETRICS & GYNECOLOGY

## 2025-04-08 RX ORDER — LANCETS
EACH MISCELLANEOUS
Qty: 100 EACH | Refills: 3 | Status: SHIPPED | OUTPATIENT
Start: 2025-04-08

## 2025-04-08 RX ORDER — CLOBETASOL PROPIONATE 0.5 MG/G
OINTMENT TOPICAL
Qty: 30 G | Refills: 3 | Status: SHIPPED | OUTPATIENT
Start: 2025-04-08

## 2025-04-08 RX ORDER — BLOOD SUGAR DIAGNOSTIC
STRIP MISCELLANEOUS
Qty: 100 EACH | Refills: 1 | Status: SHIPPED | OUTPATIENT
Start: 2025-04-08

## 2025-04-08 NOTE — PROGRESS NOTES
"GYNECOLOGY PROGRESS NOTE        CC:    Chief Complaint   Patient presents with    Annual Exam     Est patient - annual exam - no other issues  Regarding the lichens sclerosis - no issues  Mamm 5/2024 scat fibro tissue  Colon 2024  Chaperone ruben rosas ma        HPI:  Colt Brice is here for a routine GYN examination and lichen sclerosus recheck.      She states that she is using the steroid twice a week and needs a refill.  She is not really using estrogen because when she applies it it causes burning.  Prior hysterectomy at age 46 for fibroids, still has ovaries.          Depression screen:  negative  Fall screen:  negative  Domestic violence screen:  negative        ROS:  GI - no blood in BMs  URO - no hematuria  GYN - no AUB or vaginal discharge  PSYCH - mood OK        PHYSICAL EXAM:  /68 (BP Location: Left arm, Patient Position: Sitting)   Ht 1.575 m (5' 2\")   Wt 96.2 kg (212 lb 1.6 oz)   BMI 38.79 kg/m²   GEN:  A&O, NAD  ABD:  NT/ND, soft, no palpable masses  URO:  atrophic urethral meatus, no bladder TTP  GYN:  atrophic vulva and perineum w/o lesions or ulcers, minimal residual hypopigmentation changes of lichen sclerosis; atrophic vagina without discharge or lesions, cervix/uterus surgically absent, adnexa w/o masses or TTP  PERINEUM:  minimal residual hypopigmentation changes of lichen sclerosus  BREAST:  no masses or TTP, no skin lesions or nipple discharge  PSYCH:  normal affect, non-anxious        IMPRESSION/PLAN:  Problem List Items Addressed This Visit       Lichen sclerosus    Relevant Medications    clobetasol (Temovate) 0.05 % ointment    Normal gynecologic examination - Primary     Other Visit Diagnoses       Breast cancer screening by mammogram        Relevant Orders    BI mammo bilateral screening tomosynthesis    Vulvar itching        Relevant Medications    clobetasol (Temovate) 0.05 % ointment    History of colon polyps               Pap and HPV n/a, prior hysterectomy for benign " indications and no Hx of HGSIL.    Mammogram up to date and normal.  Density is scattered nearly.  Elects for yearly screening mammograms.    Colon CA screening:  Colonoscopy in 2024 with polyps, repeat due in 3 years (2027).    Lichen sclerosus:  Symptoms have completely resolved and vulvar findings greatly improved.  Vulvar biopsy done in 2018 with prior GYN at time of diagnosis of lichen sclerosus.   Will continue maintenance regimen of Clobetasol ointment 1-2 x a week--refill Rx provided, instructed the patient to use a thin film only to prevent vulvar thinning from steroid overuse.  The need for annual surveillance with lichen sclerosus due to the potential for precancerous changes in the setting of chronic inflammation was discussed with the patient.    Atrophic vulvovaginitis:  Continue use of topical estradiol cream--no refill Rx needed, off-label perineal use recommended, recommend trial of mixing with Clobetasol Rx to help prevent burning with use that is not helpful then will order compounded version of topical estrogen cream in an aloe vera base through [facility].      F/U in 1 year for lichen sclerosus recheck.      Wenceslao Faria DO

## 2025-06-05 ENCOUNTER — HOSPITAL ENCOUNTER (OUTPATIENT)
Dept: RADIOLOGY | Facility: CLINIC | Age: 65
Discharge: HOME | End: 2025-06-05
Payer: MEDICARE

## 2025-06-05 DIAGNOSIS — Z12.31 BREAST CANCER SCREENING BY MAMMOGRAM: ICD-10-CM

## 2025-06-05 PROCEDURE — 77067 SCR MAMMO BI INCL CAD: CPT | Performed by: RADIOLOGY

## 2025-06-05 PROCEDURE — 77067 SCR MAMMO BI INCL CAD: CPT

## 2025-06-05 PROCEDURE — 77063 BREAST TOMOSYNTHESIS BI: CPT | Performed by: RADIOLOGY

## 2025-06-11 ENCOUNTER — APPOINTMENT (OUTPATIENT)
Dept: PRIMARY CARE | Facility: CLINIC | Age: 65
End: 2025-06-11
Payer: MEDICARE

## 2025-06-11 DIAGNOSIS — E11.9 TYPE 2 DIABETES MELLITUS WITHOUT COMPLICATION, WITHOUT LONG-TERM CURRENT USE OF INSULIN: ICD-10-CM

## 2025-06-11 DIAGNOSIS — E66.01 CLASS 2 SEVERE OBESITY DUE TO EXCESS CALORIES WITH SERIOUS COMORBIDITY AND BODY MASS INDEX (BMI) OF 38.0 TO 38.9 IN ADULT: ICD-10-CM

## 2025-06-11 DIAGNOSIS — E66.812 CLASS 2 SEVERE OBESITY DUE TO EXCESS CALORIES WITH SERIOUS COMORBIDITY AND BODY MASS INDEX (BMI) OF 38.0 TO 38.9 IN ADULT: ICD-10-CM

## 2025-06-11 DIAGNOSIS — R07.89 CHEST WALL PAIN: Primary | ICD-10-CM

## 2025-06-11 LAB — POC HEMOGLOBIN A1C: 6.1 % (ref 4.2–6.5)

## 2025-06-11 PROCEDURE — 3044F HG A1C LEVEL LT 7.0%: CPT | Performed by: FAMILY MEDICINE

## 2025-06-11 PROCEDURE — 83036 HEMOGLOBIN GLYCOSYLATED A1C: CPT | Performed by: FAMILY MEDICINE

## 2025-06-11 PROCEDURE — 99213 OFFICE O/P EST LOW 20 MIN: CPT | Performed by: FAMILY MEDICINE

## 2025-06-11 PROCEDURE — 1036F TOBACCO NON-USER: CPT | Performed by: FAMILY MEDICINE

## 2025-06-11 RX ORDER — PREDNISONE 10 MG/1
TABLET ORAL
Qty: 51 TABLET | Refills: 0 | Status: SHIPPED | OUTPATIENT
Start: 2025-06-11

## 2025-06-11 RX ORDER — SEMAGLUTIDE 2.68 MG/ML
2 INJECTION, SOLUTION SUBCUTANEOUS
COMMUNITY
End: 2025-06-11 | Stop reason: SDUPTHER

## 2025-06-11 RX ORDER — LANCETS
EACH MISCELLANEOUS
Qty: 100 EACH | Refills: 3 | Status: SHIPPED | OUTPATIENT
Start: 2025-06-11

## 2025-06-11 RX ORDER — SEMAGLUTIDE 2.68 MG/ML
2 INJECTION, SOLUTION SUBCUTANEOUS
Qty: 3 ML | Refills: 3 | Status: SHIPPED | OUTPATIENT
Start: 2025-06-11

## 2025-06-11 RX ORDER — BLOOD-GLUCOSE METER
KIT MISCELLANEOUS
Qty: 1 EACH | Refills: 0 | Status: SHIPPED | OUTPATIENT
Start: 2025-06-11 | End: 2026-06-11

## 2025-06-11 ASSESSMENT — PATIENT HEALTH QUESTIONNAIRE - PHQ9
2. FEELING DOWN, DEPRESSED OR HOPELESS: NOT AT ALL
SUM OF ALL RESPONSES TO PHQ9 QUESTIONS 1 AND 2: 0
1. LITTLE INTEREST OR PLEASURE IN DOING THINGS: NOT AT ALL

## 2025-06-11 NOTE — PROGRESS NOTES
Subjective   Patient ID: Colt Brice is a 64 y.o. female who presents for No chief complaint on file..  HPI    Patient presents in the office today with concerns of last A1C yesterday at 7.6% and glucose at 164.  Patient has been on Ozempic since last visit on 3/13/25. Patient states medication is working well and she is ready to increase the dose.  Patient is having tenderness in the ribcage to her breast bone and down her sides.    Review of Systems  Constitutional:  no chills, no fever and no night sweats.  Eyes: no blurred vision and no eyesight problems.  ENT: no hearing loss, no nasal congestion, no hoarseness and no sore throat.  Neck: no mass (es) and no swelling.  Cardiovascular: no chest pain, no intermittent leg claudication, no lower extremity edema, no palpitation and no syncope.  Respiratory: no cough, no shortness of breath during exertion, no shortness of breath at rest and no wheezing.  Gastrointestinal: no abdominal pain, no blood in stools, no constipation, no diarrhea, no melena, no nausea, no rectal pain and no vomiting.  Genitourinary: no dysuria, no change in urinary frequency, no urinary hesitancy and no feelings of urinary urgency.  Musculoskeletal: no arthralgias, no back pain and no myalgias.  Integumentary: no new skin lesions and no rashes.  Neurological: no difficulty walking, no headache, no limb weakness, no numbness and no tingling.  Psychiatric/Behavioral: no anxiety, no depression, no anhedonia and no substance use disorders.  Endocrine: no recent weight gain and no recent weight loss.  Hematologic/Lymphatic: no tendency for easy bruising and no swollen glands    Objective   Physical Exam  Patient now on Ozempic 1 would like to go up to the 2 mg dose she is down 10 pounds A1c in the office today down to 6.1.  Overall doing well complaints of chest wall pain lateral sides of the ribs under the bottom part of the ribs and up into the sternum.  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  There were no vitals taken for this visit.    Lab Results   Component Value Date    WBC 8.8 03/12/2025    HGB 14.1 03/12/2025    HCT 42.9 03/12/2025    MCV 94.3 03/12/2025     03/12/2025       Assessment/Plan mild chest wall inflammation.  Plan burst and taper prednisone follow-up with her when we have results from her blood work she will follow-up with the pharmacist and she will let us know if this does not help may need physical therapy.  Problem List Items Addressed This Visit    None

## 2025-06-16 DIAGNOSIS — E13.9 DIABETES 1.5, MANAGED AS TYPE 1 (MULTI): Primary | ICD-10-CM

## 2025-06-16 RX ORDER — IBUPROFEN 200 MG
CAPSULE ORAL
Qty: 100 STRIP | Refills: 3 | Status: SHIPPED | OUTPATIENT
Start: 2025-06-16

## 2025-08-06 ENCOUNTER — APPOINTMENT (OUTPATIENT)
Dept: CARDIOLOGY | Facility: CLINIC | Age: 65
End: 2025-08-06
Payer: COMMERCIAL

## 2025-10-15 ENCOUNTER — APPOINTMENT (OUTPATIENT)
Dept: CARDIOLOGY | Facility: CLINIC | Age: 65
End: 2025-10-15
Payer: MEDICARE

## 2026-04-08 ENCOUNTER — APPOINTMENT (OUTPATIENT)
Dept: OBSTETRICS AND GYNECOLOGY | Facility: CLINIC | Age: 66
End: 2026-04-08
Payer: COMMERCIAL